# Patient Record
Sex: FEMALE | HISPANIC OR LATINO | Employment: FULL TIME | URBAN - METROPOLITAN AREA
[De-identification: names, ages, dates, MRNs, and addresses within clinical notes are randomized per-mention and may not be internally consistent; named-entity substitution may affect disease eponyms.]

---

## 2018-03-14 ENCOUNTER — NON-PROVIDER VISIT (OUTPATIENT)
Dept: OCCUPATIONAL MEDICINE | Facility: CLINIC | Age: 48
End: 2018-03-14

## 2018-03-14 DIAGNOSIS — Z11.1 ENCOUNTER FOR PPD TEST: ICD-10-CM

## 2018-03-14 PROCEDURE — 86580 TB INTRADERMAL TEST: CPT | Performed by: PREVENTIVE MEDICINE

## 2018-03-16 ENCOUNTER — NON-PROVIDER VISIT (OUTPATIENT)
Dept: OCCUPATIONAL MEDICINE | Facility: CLINIC | Age: 48
End: 2018-03-16

## 2018-03-16 LAB — TB WHEAL 3D P 5 TU DIAM: NORMAL MM

## 2019-05-03 ENCOUNTER — NON-PROVIDER VISIT (OUTPATIENT)
Dept: URGENT CARE | Facility: CLINIC | Age: 49
End: 2019-05-03

## 2019-05-03 DIAGNOSIS — Z11.1 PPD SCREENING TEST: ICD-10-CM

## 2019-05-03 PROCEDURE — 86580 TB INTRADERMAL TEST: CPT | Performed by: PHYSICIAN ASSISTANT

## 2019-05-06 ENCOUNTER — NON-PROVIDER VISIT (OUTPATIENT)
Dept: OCCUPATIONAL MEDICINE | Facility: CLINIC | Age: 49
End: 2019-05-06

## 2019-05-06 DIAGNOSIS — Z11.1 ENCOUNTER FOR PPD SKIN TEST READING: ICD-10-CM

## 2019-05-06 LAB — TB WHEAL 3D P 5 TU DIAM: NORMAL MM

## 2019-05-10 ENCOUNTER — NON-PROVIDER VISIT (OUTPATIENT)
Dept: OCCUPATIONAL MEDICINE | Facility: CLINIC | Age: 49
End: 2019-05-10

## 2019-05-10 DIAGNOSIS — Z11.1 ENCOUNTER FOR PPD TEST: ICD-10-CM

## 2019-05-10 PROCEDURE — 86580 TB INTRADERMAL TEST: CPT | Performed by: PREVENTIVE MEDICINE

## 2019-05-13 ENCOUNTER — NON-PROVIDER VISIT (OUTPATIENT)
Dept: OCCUPATIONAL MEDICINE | Facility: CLINIC | Age: 49
End: 2019-05-13

## 2019-05-13 LAB — TB WHEAL 3D P 5 TU DIAM: NORMAL MM

## 2022-04-12 ENCOUNTER — APPOINTMENT (OUTPATIENT)
Dept: RADIOLOGY | Facility: MEDICAL CENTER | Age: 52
End: 2022-04-12
Attending: EMERGENCY MEDICINE
Payer: MEDICAID

## 2022-04-12 ENCOUNTER — HOSPITAL ENCOUNTER (EMERGENCY)
Facility: MEDICAL CENTER | Age: 52
End: 2022-04-12
Attending: EMERGENCY MEDICINE
Payer: MEDICAID

## 2022-04-12 VITALS
OXYGEN SATURATION: 95 % | HEIGHT: 58 IN | HEART RATE: 99 BPM | DIASTOLIC BLOOD PRESSURE: 75 MMHG | WEIGHT: 250 LBS | RESPIRATION RATE: 17 BRPM | TEMPERATURE: 98.4 F | BODY MASS INDEX: 52.48 KG/M2 | SYSTOLIC BLOOD PRESSURE: 144 MMHG

## 2022-04-12 DIAGNOSIS — E86.0 DEHYDRATION: ICD-10-CM

## 2022-04-12 DIAGNOSIS — R53.83 FATIGUE, UNSPECIFIED TYPE: ICD-10-CM

## 2022-04-12 DIAGNOSIS — E11.65 UNCONTROLLED TYPE 2 DIABETES MELLITUS WITH HYPERGLYCEMIA, WITHOUT LONG-TERM CURRENT USE OF INSULIN (HCC): ICD-10-CM

## 2022-04-12 DIAGNOSIS — B37.49 CANDIDIASIS OF PERINEUM: ICD-10-CM

## 2022-04-12 DIAGNOSIS — R11.2 NAUSEA AND VOMITING, INTRACTABILITY OF VOMITING NOT SPECIFIED, UNSPECIFIED VOMITING TYPE: ICD-10-CM

## 2022-04-12 LAB
ALBUMIN SERPL BCP-MCNC: 3.9 G/DL (ref 3.2–4.9)
ALBUMIN/GLOB SERPL: 1 G/DL
ALP SERPL-CCNC: 105 U/L (ref 30–99)
ALT SERPL-CCNC: 17 U/L (ref 2–50)
ANION GAP SERPL CALC-SCNC: 17 MMOL/L (ref 7–16)
ANISOCYTOSIS BLD QL SMEAR: ABNORMAL
APPEARANCE UR: CLEAR
AST SERPL-CCNC: 12 U/L (ref 12–45)
BACTERIA #/AREA URNS HPF: NEGATIVE /HPF
BASOPHILS # BLD AUTO: 0.4 % (ref 0–1.8)
BASOPHILS # BLD: 0.04 K/UL (ref 0–0.12)
BILIRUB SERPL-MCNC: 0.4 MG/DL (ref 0.1–1.5)
BILIRUB UR QL STRIP.AUTO: NEGATIVE
BUN SERPL-MCNC: 10 MG/DL (ref 8–22)
CALCIUM SERPL-MCNC: 8.6 MG/DL (ref 8.5–10.5)
CHLORIDE SERPL-SCNC: 98 MMOL/L (ref 96–112)
CO2 SERPL-SCNC: 18 MMOL/L (ref 20–33)
COLOR UR: YELLOW
COMMENT 1642: NORMAL
CREAT SERPL-MCNC: 0.42 MG/DL (ref 0.5–1.4)
EKG IMPRESSION: NORMAL
EOSINOPHIL # BLD AUTO: 0.08 K/UL (ref 0–0.51)
EOSINOPHIL NFR BLD: 0.8 % (ref 0–6.9)
EPI CELLS #/AREA URNS HPF: NEGATIVE /HPF
ERYTHROCYTE [DISTWIDTH] IN BLOOD BY AUTOMATED COUNT: 43.4 FL (ref 35.9–50)
GFR SERPLBLD CREATININE-BSD FMLA CKD-EPI: 119 ML/MIN/1.73 M 2
GLOBULIN SER CALC-MCNC: 3.9 G/DL (ref 1.9–3.5)
GLUCOSE BLD STRIP.AUTO-MCNC: 301 MG/DL (ref 65–99)
GLUCOSE SERPL-MCNC: 329 MG/DL (ref 65–99)
GLUCOSE UR STRIP.AUTO-MCNC: >=1000 MG/DL
HCT VFR BLD AUTO: 37.9 % (ref 37–47)
HGB BLD-MCNC: 11 G/DL (ref 12–16)
HYALINE CASTS #/AREA URNS LPF: NORMAL /LPF
IMM GRANULOCYTES # BLD AUTO: 0.07 K/UL (ref 0–0.11)
IMM GRANULOCYTES NFR BLD AUTO: 0.7 % (ref 0–0.9)
KETONES UR STRIP.AUTO-MCNC: >=160 MG/DL
LEUKOCYTE ESTERASE UR QL STRIP.AUTO: NEGATIVE
LG PLATELETS BLD QL SMEAR: NORMAL
LYMPHOCYTES # BLD AUTO: 2.25 K/UL (ref 1–4.8)
LYMPHOCYTES NFR BLD: 21.1 % (ref 22–41)
MCH RBC QN AUTO: 22.2 PG (ref 27–33)
MCHC RBC AUTO-ENTMCNC: 29 G/DL (ref 33.6–35)
MCV RBC AUTO: 76.4 FL (ref 81.4–97.8)
MICRO URNS: ABNORMAL
MICROCYTES BLD QL SMEAR: ABNORMAL
MONOCYTES # BLD AUTO: 0.94 K/UL (ref 0–0.85)
MONOCYTES NFR BLD AUTO: 8.8 % (ref 0–13.4)
MORPHOLOGY BLD-IMP: NORMAL
NEUTROPHILS # BLD AUTO: 7.27 K/UL (ref 2–7.15)
NEUTROPHILS NFR BLD: 68.2 % (ref 44–72)
NITRITE UR QL STRIP.AUTO: NEGATIVE
NRBC # BLD AUTO: 0.02 K/UL
NRBC BLD-RTO: 0.2 /100 WBC
PH UR STRIP.AUTO: 5 [PH] (ref 5–8)
PLATELET # BLD AUTO: 223 K/UL (ref 164–446)
PLATELET BLD QL SMEAR: NORMAL
PMV BLD AUTO: 9.7 FL (ref 9–12.9)
POTASSIUM SERPL-SCNC: 3.8 MMOL/L (ref 3.6–5.5)
PROT SERPL-MCNC: 7.8 G/DL (ref 6–8.2)
PROT UR QL STRIP: 30 MG/DL
RBC # BLD AUTO: 4.96 M/UL (ref 4.2–5.4)
RBC # URNS HPF: NORMAL /HPF
RBC BLD AUTO: PRESENT
RBC UR QL AUTO: NEGATIVE
SODIUM SERPL-SCNC: 133 MMOL/L (ref 135–145)
SP GR UR STRIP.AUTO: 1.04
UROBILINOGEN UR STRIP.AUTO-MCNC: 0.2 MG/DL
WBC # BLD AUTO: 10.7 K/UL (ref 4.8–10.8)
WBC #/AREA URNS HPF: NORMAL /HPF

## 2022-04-12 PROCEDURE — 80053 COMPREHEN METABOLIC PANEL: CPT

## 2022-04-12 PROCEDURE — 71045 X-RAY EXAM CHEST 1 VIEW: CPT

## 2022-04-12 PROCEDURE — 85025 COMPLETE CBC W/AUTO DIFF WBC: CPT

## 2022-04-12 PROCEDURE — 81001 URINALYSIS AUTO W/SCOPE: CPT

## 2022-04-12 PROCEDURE — 700105 HCHG RX REV CODE 258: Performed by: EMERGENCY MEDICINE

## 2022-04-12 PROCEDURE — 700111 HCHG RX REV CODE 636 W/ 250 OVERRIDE (IP): Performed by: EMERGENCY MEDICINE

## 2022-04-12 PROCEDURE — 36415 COLL VENOUS BLD VENIPUNCTURE: CPT

## 2022-04-12 PROCEDURE — 99284 EMERGENCY DEPT VISIT MOD MDM: CPT

## 2022-04-12 PROCEDURE — 96374 THER/PROPH/DIAG INJ IV PUSH: CPT

## 2022-04-12 PROCEDURE — 700102 HCHG RX REV CODE 250 W/ 637 OVERRIDE(OP): Performed by: EMERGENCY MEDICINE

## 2022-04-12 PROCEDURE — A9270 NON-COVERED ITEM OR SERVICE: HCPCS | Performed by: EMERGENCY MEDICINE

## 2022-04-12 PROCEDURE — 93005 ELECTROCARDIOGRAM TRACING: CPT

## 2022-04-12 PROCEDURE — 93005 ELECTROCARDIOGRAM TRACING: CPT | Performed by: EMERGENCY MEDICINE

## 2022-04-12 PROCEDURE — 82962 GLUCOSE BLOOD TEST: CPT

## 2022-04-12 RX ORDER — ONDANSETRON 2 MG/ML
4 INJECTION INTRAMUSCULAR; INTRAVENOUS ONCE
Status: COMPLETED | OUTPATIENT
Start: 2022-04-12 | End: 2022-04-12

## 2022-04-12 RX ORDER — ONDANSETRON 4 MG/1
4 TABLET, ORALLY DISINTEGRATING ORAL EVERY 6 HOURS PRN
Qty: 10 TABLET | Refills: 0 | Status: SHIPPED | OUTPATIENT
Start: 2022-04-12 | End: 2022-05-16

## 2022-04-12 RX ORDER — SODIUM CHLORIDE 9 MG/ML
1000 INJECTION, SOLUTION INTRAVENOUS ONCE
Status: COMPLETED | OUTPATIENT
Start: 2022-04-12 | End: 2022-04-12

## 2022-04-12 RX ORDER — FLUCONAZOLE 150 MG/1
150 TABLET ORAL DAILY
Qty: 1 TABLET | Refills: 1 | Status: SHIPPED | OUTPATIENT
Start: 2022-04-15 | End: 2022-05-16 | Stop reason: SDUPTHER

## 2022-04-12 RX ADMIN — ONDANSETRON 4 MG: 2 INJECTION INTRAMUSCULAR; INTRAVENOUS at 21:01

## 2022-04-12 RX ADMIN — METFORMIN HYDROCHLORIDE 1000 MG: 500 TABLET ORAL at 21:01

## 2022-04-12 RX ADMIN — SODIUM CHLORIDE 1000 ML: 9 INJECTION, SOLUTION INTRAVENOUS at 21:00

## 2022-04-13 ENCOUNTER — APPOINTMENT (OUTPATIENT)
Dept: RADIOLOGY | Facility: MEDICAL CENTER | Age: 52
DRG: 166 | End: 2022-04-13
Attending: EMERGENCY MEDICINE
Payer: MEDICAID

## 2022-04-13 ENCOUNTER — HOSPITAL ENCOUNTER (INPATIENT)
Facility: MEDICAL CENTER | Age: 52
LOS: 3 days | DRG: 166 | End: 2022-04-16
Attending: EMERGENCY MEDICINE | Admitting: INTERNAL MEDICINE
Payer: MEDICAID

## 2022-04-13 DIAGNOSIS — I26.99 PULMONARY EMBOLISM, BILATERAL (HCC): Primary | ICD-10-CM

## 2022-04-13 DIAGNOSIS — E11.10 DIABETIC KETOACIDOSIS WITHOUT COMA ASSOCIATED WITH TYPE 2 DIABETES MELLITUS (HCC): ICD-10-CM

## 2022-04-13 DIAGNOSIS — E87.29 HIGH ANION GAP METABOLIC ACIDOSIS: ICD-10-CM

## 2022-04-13 DIAGNOSIS — E11.65 UNCONTROLLED TYPE 2 DIABETES MELLITUS WITH HYPERGLYCEMIA, WITHOUT LONG-TERM CURRENT USE OF INSULIN (HCC): ICD-10-CM

## 2022-04-13 DIAGNOSIS — I26.02 ACUTE SADDLE PULMONARY EMBOLISM WITH ACUTE COR PULMONALE (HCC): ICD-10-CM

## 2022-04-13 DIAGNOSIS — E10.10 DKA, TYPE 1, NOT AT GOAL (HCC): ICD-10-CM

## 2022-04-13 DIAGNOSIS — I27.20 PULMONARY HTN (HCC): ICD-10-CM

## 2022-04-13 DIAGNOSIS — D64.9 ANEMIA, UNSPECIFIED TYPE: ICD-10-CM

## 2022-04-13 DIAGNOSIS — I26.92 SADDLE EMBOLUS OF PULMONARY ARTERY WITHOUT ACUTE COR PULMONALE, UNSPECIFIED CHRONICITY (HCC): ICD-10-CM

## 2022-04-13 DIAGNOSIS — R00.0 SINUS TACHYCARDIA: ICD-10-CM

## 2022-04-13 PROBLEM — E87.20 METABOLIC ACIDOSIS: Status: ACTIVE | Noted: 2022-04-13

## 2022-04-13 PROBLEM — R73.9 HYPERGLYCEMIA: Status: ACTIVE | Noted: 2022-04-13

## 2022-04-13 LAB
ALBUMIN SERPL BCP-MCNC: 3.6 G/DL (ref 3.2–4.9)
ALBUMIN/GLOB SERPL: 1 G/DL
ALP SERPL-CCNC: 105 U/L (ref 30–99)
ALT SERPL-CCNC: 21 U/L (ref 2–50)
ANION GAP SERPL CALC-SCNC: 20 MMOL/L (ref 7–16)
APPEARANCE UR: CLEAR
AST SERPL-CCNC: 13 U/L (ref 12–45)
B-OH-BUTYR SERPL-MCNC: 2.98 MMOL/L (ref 0.02–0.27)
BACTERIA #/AREA URNS HPF: ABNORMAL /HPF
BASE EXCESS BLDV CALC-SCNC: -11 MMOL/L
BASE EXCESS BLDV CALC-SCNC: -12 MMOL/L
BASOPHILS # BLD AUTO: 0.1 % (ref 0–1.8)
BASOPHILS # BLD: 0.02 K/UL (ref 0–0.12)
BILIRUB SERPL-MCNC: 0.5 MG/DL (ref 0.1–1.5)
BILIRUB UR QL STRIP.AUTO: NEGATIVE
BODY TEMPERATURE: 36.2 CENTIGRADE
BODY TEMPERATURE: 36.7 CENTIGRADE
BUN SERPL-MCNC: 20 MG/DL (ref 8–22)
CALCIUM SERPL-MCNC: 8.2 MG/DL (ref 8.5–10.5)
CHLORIDE SERPL-SCNC: 97 MMOL/L (ref 96–112)
CO2 SERPL-SCNC: 14 MMOL/L (ref 20–33)
COLOR UR: YELLOW
CREAT SERPL-MCNC: 0.91 MG/DL (ref 0.5–1.4)
EOSINOPHIL # BLD AUTO: 0.01 K/UL (ref 0–0.51)
EOSINOPHIL NFR BLD: 0.1 % (ref 0–6.9)
EPI CELLS #/AREA URNS HPF: NEGATIVE /HPF
ERYTHROCYTE [DISTWIDTH] IN BLOOD BY AUTOMATED COUNT: 46.3 FL (ref 35.9–50)
GFR SERPLBLD CREATININE-BSD FMLA CKD-EPI: 77 ML/MIN/1.73 M 2
GLOBULIN SER CALC-MCNC: 3.7 G/DL (ref 1.9–3.5)
GLUCOSE BLD STRIP.AUTO-MCNC: 399 MG/DL (ref 65–99)
GLUCOSE BLD STRIP.AUTO-MCNC: 410 MG/DL (ref 65–99)
GLUCOSE SERPL-MCNC: 541 MG/DL (ref 65–99)
GLUCOSE UR STRIP.AUTO-MCNC: >=1000 MG/DL
HCO3 BLDV-SCNC: 14 MMOL/L (ref 24–28)
HCO3 BLDV-SCNC: 15 MMOL/L (ref 24–28)
HCT VFR BLD AUTO: 37.5 % (ref 37–47)
HGB BLD-MCNC: 10.6 G/DL (ref 12–16)
HYALINE CASTS #/AREA URNS LPF: ABNORMAL /LPF
IMM GRANULOCYTES # BLD AUTO: 0.13 K/UL (ref 0–0.11)
IMM GRANULOCYTES NFR BLD AUTO: 0.9 % (ref 0–0.9)
KETONES UR STRIP.AUTO-MCNC: 15 MG/DL
LACTATE BLD-SCNC: 2.6 MMOL/L (ref 0.5–2)
LACTATE BLD-SCNC: 3.5 MMOL/L (ref 0.5–2)
LACTATE BLD-SCNC: 3.6 MMOL/L (ref 0.5–2)
LEUKOCYTE ESTERASE UR QL STRIP.AUTO: NEGATIVE
LYMPHOCYTES # BLD AUTO: 2.01 K/UL (ref 1–4.8)
LYMPHOCYTES NFR BLD: 13.6 % (ref 22–41)
MAGNESIUM SERPL-MCNC: 1.8 MG/DL (ref 1.5–2.5)
MCH RBC QN AUTO: 22.2 PG (ref 27–33)
MCHC RBC AUTO-ENTMCNC: 28.3 G/DL (ref 33.6–35)
MCV RBC AUTO: 78.6 FL (ref 81.4–97.8)
MICRO URNS: ABNORMAL
MONOCYTES # BLD AUTO: 1.07 K/UL (ref 0–0.85)
MONOCYTES NFR BLD AUTO: 7.2 % (ref 0–13.4)
NEUTROPHILS # BLD AUTO: 11.55 K/UL (ref 2–7.15)
NEUTROPHILS NFR BLD: 78.1 % (ref 44–72)
NITRITE UR QL STRIP.AUTO: NEGATIVE
NRBC # BLD AUTO: 0.08 K/UL
NRBC BLD-RTO: 0.5 /100 WBC
NT-PROBNP SERPL IA-MCNC: 3961 PG/ML (ref 0–125)
PCO2 BLDV: 28.7 MMHG (ref 41–51)
PCO2 BLDV: 41.4 MMHG (ref 41–51)
PCO2 TEMP ADJ BLDV: 28.3 MMHG (ref 41–51)
PCO2 TEMP ADJ BLDV: 39.6 MMHG (ref 41–51)
PH BLDV: 7.18 [PH] (ref 7.31–7.45)
PH BLDV: 7.3 [PH] (ref 7.31–7.45)
PH TEMP ADJ BLDV: 7.2 [PH] (ref 7.31–7.45)
PH TEMP ADJ BLDV: 7.31 [PH] (ref 7.31–7.45)
PH UR STRIP.AUTO: 5.5 [PH] (ref 5–8)
PLATELET # BLD AUTO: 220 K/UL (ref 164–446)
PMV BLD AUTO: 10.5 FL (ref 9–12.9)
PO2 BLDV: 18.9 MMHG (ref 25–40)
PO2 BLDV: 56 MMHG (ref 25–40)
PO2 TEMP ADJ BLDV: 17.6 MMHG (ref 25–40)
PO2 TEMP ADJ BLDV: 54.8 MMHG (ref 25–40)
POTASSIUM SERPL-SCNC: 4.4 MMOL/L (ref 3.6–5.5)
PROT SERPL-MCNC: 7.3 G/DL (ref 6–8.2)
PROT UR QL STRIP: 100 MG/DL
RBC # BLD AUTO: 4.77 M/UL (ref 4.2–5.4)
RBC # URNS HPF: ABNORMAL /HPF
RBC UR QL AUTO: NEGATIVE
SAO2 % BLDV: 16.9 %
SAO2 % BLDV: 84.1 %
SODIUM SERPL-SCNC: 131 MMOL/L (ref 135–145)
SP GR UR STRIP.AUTO: 1.03
TROPONIN T SERPL-MCNC: 17 NG/L (ref 6–19)
UROBILINOGEN UR STRIP.AUTO-MCNC: 1 MG/DL
WBC # BLD AUTO: 14.8 K/UL (ref 4.8–10.8)
WBC #/AREA URNS HPF: ABNORMAL /HPF

## 2022-04-13 PROCEDURE — 700111 HCHG RX REV CODE 636 W/ 250 OVERRIDE (IP): Performed by: STUDENT IN AN ORGANIZED HEALTH CARE EDUCATION/TRAINING PROGRAM

## 2022-04-13 PROCEDURE — 93005 ELECTROCARDIOGRAM TRACING: CPT | Performed by: EMERGENCY MEDICINE

## 2022-04-13 PROCEDURE — 96374 THER/PROPH/DIAG INJ IV PUSH: CPT

## 2022-04-13 PROCEDURE — 85610 PROTHROMBIN TIME: CPT

## 2022-04-13 PROCEDURE — 94760 N-INVAS EAR/PLS OXIMETRY 1: CPT

## 2022-04-13 PROCEDURE — 83880 ASSAY OF NATRIURETIC PEPTIDE: CPT

## 2022-04-13 PROCEDURE — 85730 THROMBOPLASTIN TIME PARTIAL: CPT

## 2022-04-13 PROCEDURE — 85520 HEPARIN ASSAY: CPT

## 2022-04-13 PROCEDURE — 96375 TX/PRO/DX INJ NEW DRUG ADDON: CPT

## 2022-04-13 PROCEDURE — 84484 ASSAY OF TROPONIN QUANT: CPT

## 2022-04-13 PROCEDURE — 71045 X-RAY EXAM CHEST 1 VIEW: CPT

## 2022-04-13 PROCEDURE — 700105 HCHG RX REV CODE 258: Performed by: STUDENT IN AN ORGANIZED HEALTH CARE EDUCATION/TRAINING PROGRAM

## 2022-04-13 PROCEDURE — 71275 CT ANGIOGRAPHY CHEST: CPT

## 2022-04-13 PROCEDURE — 700105 HCHG RX REV CODE 258: Performed by: EMERGENCY MEDICINE

## 2022-04-13 PROCEDURE — 36415 COLL VENOUS BLD VENIPUNCTURE: CPT

## 2022-04-13 PROCEDURE — 700117 HCHG RX CONTRAST REV CODE 255: Performed by: EMERGENCY MEDICINE

## 2022-04-13 PROCEDURE — 83605 ASSAY OF LACTIC ACID: CPT

## 2022-04-13 PROCEDURE — 770022 HCHG ROOM/CARE - ICU (200)

## 2022-04-13 PROCEDURE — 83735 ASSAY OF MAGNESIUM: CPT

## 2022-04-13 PROCEDURE — 99292 CRITICAL CARE ADDL 30 MIN: CPT | Mod: GC | Performed by: STUDENT IN AN ORGANIZED HEALTH CARE EDUCATION/TRAINING PROGRAM

## 2022-04-13 PROCEDURE — 82803 BLOOD GASES ANY COMBINATION: CPT

## 2022-04-13 PROCEDURE — 87040 BLOOD CULTURE FOR BACTERIA: CPT

## 2022-04-13 PROCEDURE — 700102 HCHG RX REV CODE 250 W/ 637 OVERRIDE(OP): Performed by: STUDENT IN AN ORGANIZED HEALTH CARE EDUCATION/TRAINING PROGRAM

## 2022-04-13 PROCEDURE — 80053 COMPREHEN METABOLIC PANEL: CPT

## 2022-04-13 PROCEDURE — 87086 URINE CULTURE/COLONY COUNT: CPT

## 2022-04-13 PROCEDURE — 700111 HCHG RX REV CODE 636 W/ 250 OVERRIDE (IP): Performed by: EMERGENCY MEDICINE

## 2022-04-13 PROCEDURE — 99291 CRITICAL CARE FIRST HOUR: CPT | Mod: GC | Performed by: STUDENT IN AN ORGANIZED HEALTH CARE EDUCATION/TRAINING PROGRAM

## 2022-04-13 PROCEDURE — 81001 URINALYSIS AUTO W/SCOPE: CPT

## 2022-04-13 PROCEDURE — 99291 CRITICAL CARE FIRST HOUR: CPT

## 2022-04-13 PROCEDURE — 82010 KETONE BODYS QUAN: CPT

## 2022-04-13 PROCEDURE — 87077 CULTURE AEROBIC IDENTIFY: CPT

## 2022-04-13 PROCEDURE — 82962 GLUCOSE BLOOD TEST: CPT

## 2022-04-13 PROCEDURE — 700102 HCHG RX REV CODE 250 W/ 637 OVERRIDE(OP): Performed by: EMERGENCY MEDICINE

## 2022-04-13 PROCEDURE — 85025 COMPLETE CBC W/AUTO DIFF WBC: CPT

## 2022-04-13 RX ORDER — MAGNESIUM SULFATE HEPTAHYDRATE 40 MG/ML
4 INJECTION, SOLUTION INTRAVENOUS
Status: COMPLETED | OUTPATIENT
Start: 2022-04-13 | End: 2022-04-14

## 2022-04-13 RX ORDER — DEXTROSE MONOHYDRATE 25 G/50ML
25 INJECTION, SOLUTION INTRAVENOUS
Status: DISCONTINUED | OUTPATIENT
Start: 2022-04-13 | End: 2022-04-14

## 2022-04-13 RX ORDER — PROMETHAZINE HYDROCHLORIDE 25 MG/1
12.5-25 SUPPOSITORY RECTAL EVERY 4 HOURS PRN
Status: DISCONTINUED | OUTPATIENT
Start: 2022-04-13 | End: 2022-04-16 | Stop reason: HOSPADM

## 2022-04-13 RX ORDER — DEXTROSE AND SODIUM CHLORIDE 10; .45 G/100ML; G/100ML
INJECTION, SOLUTION INTRAVENOUS CONTINUOUS
Status: ACTIVE | OUTPATIENT
Start: 2022-04-13 | End: 2022-04-14

## 2022-04-13 RX ORDER — ACETAMINOPHEN 325 MG/1
650 TABLET ORAL EVERY 6 HOURS PRN
Status: DISCONTINUED | OUTPATIENT
Start: 2022-04-13 | End: 2022-04-16 | Stop reason: HOSPADM

## 2022-04-13 RX ORDER — ONDANSETRON 2 MG/ML
4 INJECTION INTRAMUSCULAR; INTRAVENOUS EVERY 4 HOURS PRN
Status: DISCONTINUED | OUTPATIENT
Start: 2022-04-13 | End: 2022-04-16 | Stop reason: HOSPADM

## 2022-04-13 RX ORDER — HEPARIN SODIUM 1000 [USP'U]/ML
40 INJECTION, SOLUTION INTRAVENOUS; SUBCUTANEOUS PRN
Status: DISCONTINUED | OUTPATIENT
Start: 2022-04-13 | End: 2022-04-14

## 2022-04-13 RX ORDER — PROCHLORPERAZINE EDISYLATE 5 MG/ML
5-10 INJECTION INTRAMUSCULAR; INTRAVENOUS EVERY 4 HOURS PRN
Status: DISCONTINUED | OUTPATIENT
Start: 2022-04-13 | End: 2022-04-16 | Stop reason: HOSPADM

## 2022-04-13 RX ORDER — LABETALOL HYDROCHLORIDE 5 MG/ML
10 INJECTION, SOLUTION INTRAVENOUS EVERY 4 HOURS PRN
Status: DISCONTINUED | OUTPATIENT
Start: 2022-04-13 | End: 2022-04-13

## 2022-04-13 RX ORDER — SODIUM CHLORIDE 9 MG/ML
2000 INJECTION, SOLUTION INTRAVENOUS ONCE
Status: DISCONTINUED | OUTPATIENT
Start: 2022-04-13 | End: 2022-04-14

## 2022-04-13 RX ORDER — FUROSEMIDE 10 MG/ML
20 INJECTION INTRAMUSCULAR; INTRAVENOUS ONCE
Status: COMPLETED | OUTPATIENT
Start: 2022-04-13 | End: 2022-04-13

## 2022-04-13 RX ORDER — HEPARIN SODIUM 5000 [USP'U]/ML
5000 INJECTION, SOLUTION INTRAVENOUS; SUBCUTANEOUS EVERY 8 HOURS
Status: DISCONTINUED | OUTPATIENT
Start: 2022-04-13 | End: 2022-04-13

## 2022-04-13 RX ORDER — POLYETHYLENE GLYCOL 3350 17 G/17G
1 POWDER, FOR SOLUTION ORAL
Status: DISCONTINUED | OUTPATIENT
Start: 2022-04-13 | End: 2022-04-16 | Stop reason: HOSPADM

## 2022-04-13 RX ORDER — SODIUM CHLORIDE 9 MG/ML
1000 INJECTION, SOLUTION INTRAVENOUS ONCE
Status: COMPLETED | OUTPATIENT
Start: 2022-04-13 | End: 2022-04-13

## 2022-04-13 RX ORDER — SODIUM CHLORIDE, SODIUM LACTATE, POTASSIUM CHLORIDE, CALCIUM CHLORIDE 600; 310; 30; 20 MG/100ML; MG/100ML; MG/100ML; MG/100ML
INJECTION, SOLUTION INTRAVENOUS CONTINUOUS
Status: DISCONTINUED | OUTPATIENT
Start: 2022-04-13 | End: 2022-04-14

## 2022-04-13 RX ORDER — MAGNESIUM SULFATE HEPTAHYDRATE 40 MG/ML
2 INJECTION, SOLUTION INTRAVENOUS
Status: COMPLETED | OUTPATIENT
Start: 2022-04-13 | End: 2022-04-14

## 2022-04-13 RX ORDER — DEXTROSE, SODIUM CHLORIDE, SODIUM LACTATE, POTASSIUM CHLORIDE, AND CALCIUM CHLORIDE 5; .6; .31; .03; .02 G/100ML; G/100ML; G/100ML; G/100ML; G/100ML
INJECTION, SOLUTION INTRAVENOUS CONTINUOUS
Status: DISCONTINUED | OUTPATIENT
Start: 2022-04-13 | End: 2022-04-14

## 2022-04-13 RX ORDER — BISACODYL 10 MG
10 SUPPOSITORY, RECTAL RECTAL
Status: DISCONTINUED | OUTPATIENT
Start: 2022-04-13 | End: 2022-04-16 | Stop reason: HOSPADM

## 2022-04-13 RX ORDER — ONDANSETRON 4 MG/1
4 TABLET, ORALLY DISINTEGRATING ORAL EVERY 4 HOURS PRN
Status: DISCONTINUED | OUTPATIENT
Start: 2022-04-13 | End: 2022-04-16 | Stop reason: HOSPADM

## 2022-04-13 RX ORDER — FLUCONAZOLE 150 MG/1
150 TABLET ORAL DAILY
Status: DISCONTINUED | OUTPATIENT
Start: 2022-04-14 | End: 2022-04-13

## 2022-04-13 RX ORDER — GUAIFENESIN/DEXTROMETHORPHAN 100-10MG/5
10 SYRUP ORAL EVERY 6 HOURS PRN
Status: DISCONTINUED | OUTPATIENT
Start: 2022-04-13 | End: 2022-04-16 | Stop reason: HOSPADM

## 2022-04-13 RX ORDER — AMOXICILLIN 250 MG
2 CAPSULE ORAL 2 TIMES DAILY
Status: DISCONTINUED | OUTPATIENT
Start: 2022-04-13 | End: 2022-04-16 | Stop reason: HOSPADM

## 2022-04-13 RX ORDER — PROMETHAZINE HYDROCHLORIDE 25 MG/1
12.5-25 TABLET ORAL EVERY 4 HOURS PRN
Status: DISCONTINUED | OUTPATIENT
Start: 2022-04-13 | End: 2022-04-16 | Stop reason: HOSPADM

## 2022-04-13 RX ORDER — HEPARIN SODIUM 5000 [USP'U]/100ML
0-30 INJECTION, SOLUTION INTRAVENOUS CONTINUOUS
Status: DISCONTINUED | OUTPATIENT
Start: 2022-04-13 | End: 2022-04-14

## 2022-04-13 RX ORDER — HEPARIN SODIUM 1000 [USP'U]/ML
80 INJECTION, SOLUTION INTRAVENOUS; SUBCUTANEOUS ONCE
Status: COMPLETED | OUTPATIENT
Start: 2022-04-13 | End: 2022-04-13

## 2022-04-13 RX ORDER — SODIUM CHLORIDE, SODIUM LACTATE, POTASSIUM CHLORIDE, AND CALCIUM CHLORIDE .6; .31; .03; .02 G/100ML; G/100ML; G/100ML; G/100ML
500 INJECTION, SOLUTION INTRAVENOUS ONCE
Status: DISCONTINUED | OUTPATIENT
Start: 2022-04-13 | End: 2022-04-13

## 2022-04-13 RX ORDER — ONDANSETRON 2 MG/ML
4 INJECTION INTRAMUSCULAR; INTRAVENOUS ONCE
Status: COMPLETED | OUTPATIENT
Start: 2022-04-13 | End: 2022-04-13

## 2022-04-13 RX ORDER — SODIUM CHLORIDE, SODIUM LACTATE, POTASSIUM CHLORIDE, CALCIUM CHLORIDE 600; 310; 30; 20 MG/100ML; MG/100ML; MG/100ML; MG/100ML
INJECTION, SOLUTION INTRAVENOUS CONTINUOUS
Status: DISCONTINUED | OUTPATIENT
Start: 2022-04-13 | End: 2022-04-13

## 2022-04-13 RX ORDER — SODIUM CHLORIDE, SODIUM LACTATE, POTASSIUM CHLORIDE, AND CALCIUM CHLORIDE .6; .31; .03; .02 G/100ML; G/100ML; G/100ML; G/100ML
1000 INJECTION, SOLUTION INTRAVENOUS ONCE
Status: COMPLETED | OUTPATIENT
Start: 2022-04-13 | End: 2022-04-14

## 2022-04-13 RX ADMIN — SODIUM BICARBONATE 50 MEQ: 84 INJECTION, SOLUTION INTRAVENOUS at 23:51

## 2022-04-13 RX ADMIN — SODIUM CHLORIDE 6 UNITS/HR: 9 INJECTION, SOLUTION INTRAVENOUS at 23:34

## 2022-04-13 RX ADMIN — SODIUM CHLORIDE, POTASSIUM CHLORIDE, SODIUM LACTATE AND CALCIUM CHLORIDE 1000 ML: 600; 310; 30; 20 INJECTION, SOLUTION INTRAVENOUS at 23:55

## 2022-04-13 RX ADMIN — INSULIN HUMAN 10 UNITS: 100 INJECTION, SOLUTION PARENTERAL at 20:15

## 2022-04-13 RX ADMIN — HEPARIN SODIUM 18 UNITS/KG/HR: 5000 INJECTION, SOLUTION INTRAVENOUS at 23:41

## 2022-04-13 RX ADMIN — FUROSEMIDE 20 MG: 10 INJECTION, SOLUTION INTRAMUSCULAR; INTRAVENOUS at 21:31

## 2022-04-13 RX ADMIN — SODIUM CHLORIDE 1000 ML: 9 INJECTION, SOLUTION INTRAVENOUS at 21:32

## 2022-04-13 RX ADMIN — SODIUM CHLORIDE, POTASSIUM CHLORIDE, SODIUM LACTATE AND CALCIUM CHLORIDE: 600; 310; 30; 20 INJECTION, SOLUTION INTRAVENOUS at 23:35

## 2022-04-13 RX ADMIN — SODIUM CHLORIDE 1000 ML: 9 INJECTION, SOLUTION INTRAVENOUS at 19:01

## 2022-04-13 RX ADMIN — IOHEXOL 45 ML: 350 INJECTION, SOLUTION INTRAVENOUS at 22:25

## 2022-04-13 RX ADMIN — HEPARIN SODIUM 5800 UNITS: 1000 INJECTION, SOLUTION INTRAVENOUS; SUBCUTANEOUS at 23:41

## 2022-04-13 RX ADMIN — ONDANSETRON 4 MG: 2 INJECTION INTRAMUSCULAR; INTRAVENOUS at 19:01

## 2022-04-13 ASSESSMENT — FIBROSIS 4 INDEX
FIB4 SCORE: 0.65
FIB4 SCORE: 0.64

## 2022-04-13 NOTE — ED TRIAGE NOTES
.  Chief Complaint   Patient presents with   • Shortness of Breath     X 1 day Worse when walking   • Cough     X 1 day   • Weakness   • Yeast Infection     Per pt      Pt to triage with family by w/c. Reports above c/c x 1 day. Chronic yeast infection. EKG complete on arrival.

## 2022-04-13 NOTE — ED NOTES
Assessment made. Chart up for MD to see. C/o SOB  With exertion started last night. Also states that she might have a yeast infection.

## 2022-04-13 NOTE — ED PROVIDER NOTES
ED Provider Note    ED Provider Note    Scribed for Michel Morales MD by Michel Morales M.D.. 4/12/2022, 8:24 PM.    Primary care provider: Gabrielle Layton P.A.-C.  Means of arrival: Private  History obtained from: Patient and daughter  History limited by: None    CHIEF COMPLAINT  Chief Complaint   Patient presents with   • Shortness of Breath     X 1 day Worse when walking   • Cough     X 1 day   • Weakness   • Yeast Infection     Per pt        HPI  Alis Faye is a 51 y.o. female who presents to the Emergency Department for evaluation of generalized fatigue.  Patient notes she feels dyspneic when she is exerting herself including actives of daily living, this is been going on for least 4 months.  She lost her medical insurance and has not been taking her Metformin for that time.  She notes polyuria polydipsia.  She is tachycardic and does appear to be dehydrated is indeed hyperglycemic.  She notes the same time she has had a pruritic but not particularly painful large amount of white exudate from her vagina, she notes this is similar to previous yeast infections, she is used no over-the-counter medications for it.  No fever, nausea and dry mouth and polydipsia but no vomiting.  No vaginal bleeding.  No cough, no chest pain.    REVIEW OF SYSTEMS  Pertinent positives include dry mouth, fatigue, polydipsia, medication noncompliance. Pertinent negatives include no fever, no vaginal bleeding, no dysuria, no cough.  All other systems reviewed and negative.    PAST MEDICAL HISTORY   has a past medical history of Anemia, Diabetes, and Obesity.    SURGICAL HISTORY  patient denies any surgical history    SOCIAL HISTORY  Social History     Tobacco Use   • Smoking status: Never Smoker   Substance Use Topics   • Alcohol use: No   • Drug use: No      Social History     Substance and Sexual Activity   Drug Use No       FAMILY HISTORY  Noncontributory    CURRENT MEDICATIONS  Home Medications     Reviewed  "by Sherrie Hunter R.N. (Registered Nurse) on 04/12/22 at 1813  Med List Status: Complete   Medication Last Dose Status   hydrocodone-acetaminophen (NORCO) 5-325 MG TABS per tablet  Active   ibuprofen (MOTRIN) 800 MG TABS  Active   metformin (GLUCOPHAGE) 500 MG TABS Not Taking Active                ALLERGIES  Allergies   Allergen Reactions   • Nkda [No Known Drug Allergy]        PHYSICAL EXAM  VITAL SIGNS: /75   Pulse 99   Temp 36.9 °C (98.4 °F) (Temporal)   Resp 17   Ht 1.473 m (4' 10\")   Wt 113 kg (250 lb)   LMP 03/28/2022   SpO2 95%   BMI 52.25 kg/m²     General: Alert, no acute distress  Skin: Warm, dry, normal for ethnicity  Head: Normocephalic, atraumatic  Neck: Trachea midline, no tenderness  Eye: PERRL, normal conjunctiva, sclera are anicteric.  ENMT: Oral mucosa pink and dry.  Cardiovascular: S1, S2, mildly tachycardic, otherwise regular rate and rhythm, No murmur, Normal peripheral perfusion  Respiratory: Lungs CTA, respirations are non-labored, breath sounds are equal; no Rales, no rhonchi, no accessory muscle use nor retractions appreciated.  Gastrointestinal: Soft,  non distended.   exam demonstrates moderate white.  Exudate both within the vaginal vault and along the perineum consistent with candidiasis  Musculoskeletal: No swelling, no deformity  Neurological: Alert and oriented to person, place, time, and situation  Lymphatics: No lymphadenopathy  Psychiatric: Cooperative, mildly anxious, otherwise appropriate mood & affect      DIAGNOSTIC STUDIES/PROCEDURES    LABS  Results for orders placed or performed during the hospital encounter of 04/12/22   CBC with Differential   Result Value Ref Range    WBC 10.7 4.8 - 10.8 K/uL    RBC 4.96 4.20 - 5.40 M/uL    Hemoglobin 11.0 (L) 12.0 - 16.0 g/dL    Hematocrit 37.9 37.0 - 47.0 %    MCV 76.4 (L) 81.4 - 97.8 fL    MCH 22.2 (L) 27.0 - 33.0 pg    MCHC 29.0 (L) 33.6 - 35.0 g/dL    RDW 43.4 35.9 - 50.0 fL    Platelet Count 223 164 - 446 K/uL "    MPV 9.7 9.0 - 12.9 fL    Neutrophils-Polys 68.20 44.00 - 72.00 %    Lymphocytes 21.10 (L) 22.00 - 41.00 %    Monocytes 8.80 0.00 - 13.40 %    Eosinophils 0.80 0.00 - 6.90 %    Basophils 0.40 0.00 - 1.80 %    Immature Granulocytes 0.70 0.00 - 0.90 %    Nucleated RBC 0.20 /100 WBC    Neutrophils (Absolute) 7.27 (H) 2.00 - 7.15 K/uL    Lymphs (Absolute) 2.25 1.00 - 4.80 K/uL    Monos (Absolute) 0.94 (H) 0.00 - 0.85 K/uL    Eos (Absolute) 0.08 0.00 - 0.51 K/uL    Baso (Absolute) 0.04 0.00 - 0.12 K/uL    Immature Granulocytes (abs) 0.07 0.00 - 0.11 K/uL    NRBC (Absolute) 0.02 K/uL    Anisocytosis 1+     Microcytosis 1+    Comp Metabolic Panel   Result Value Ref Range    Sodium 133 (L) 135 - 145 mmol/L    Potassium 3.8 3.6 - 5.5 mmol/L    Chloride 98 96 - 112 mmol/L    Co2 18 (L) 20 - 33 mmol/L    Anion Gap 17.0 (H) 7.0 - 16.0    Glucose 329 (H) 65 - 99 mg/dL    Bun 10 8 - 22 mg/dL    Creatinine 0.42 (L) 0.50 - 1.40 mg/dL    Calcium 8.6 8.5 - 10.5 mg/dL    AST(SGOT) 12 12 - 45 U/L    ALT(SGPT) 17 2 - 50 U/L    Alkaline Phosphatase 105 (H) 30 - 99 U/L    Total Bilirubin 0.4 0.1 - 1.5 mg/dL    Albumin 3.9 3.2 - 4.9 g/dL    Total Protein 7.8 6.0 - 8.2 g/dL    Globulin 3.9 (H) 1.9 - 3.5 g/dL    A-G Ratio 1.0 g/dL   ESTIMATED GFR   Result Value Ref Range    GFR (CKD-EPI) 119 >60 mL/min/1.73 m 2   URINALYSIS    Specimen: Urine, Clean Catch   Result Value Ref Range    Color Yellow     Character Clear     Specific Gravity 1.044 <1.035    Ph 5.0 5.0 - 8.0    Glucose >=1000 (A) Negative mg/dL    Ketones >=160 Negative mg/dL    Protein 30 (A) Negative mg/dL    Bilirubin Negative Negative    Urobilinogen, Urine 0.2 Negative    Nitrite Negative Negative    Leukocyte Esterase Negative Negative    Occult Blood Negative Negative    Micro Urine Req Microscopic    URINE MICROSCOPIC (W/UA)   Result Value Ref Range    WBC 2-5 /hpf    RBC 0-2 /hpf    Bacteria Negative None /hpf    Epithelial Cells Negative /hpf    Hyaline Cast 0-2 /lpf    PERIPHERAL SMEAR REVIEW   Result Value Ref Range    Peripheral Smear Review see below    PLATELET ESTIMATE   Result Value Ref Range    Plt Estimation Normal    MORPHOLOGY   Result Value Ref Range    RBC Morphology Present     Large Platelets 1+    DIFFERENTIAL COMMENT   Result Value Ref Range    Comments-Diff see below    EKG (NOW)   Result Value Ref Range    Report       [facility] Emergency Dept.    Test Date:  2022  Pt Name:    TERESO TERESA              Department: ER  MRN:        [id number]                      Room:  Gender:     Female                       Technician: [id number]  :        1970                   Requested By:ER TRIAGE PROTOCOL  Order #:    [id number]                    Reading MD: EMMA PEREZ MD    Measurements  Intervals                                Axis  Rate:       119                          P:          49  OH:         133                          QRS:        122  QRSD:       92                           T:          -27  QT:         343  QTc:        483    Interpretive Statements  Sinus tachycardia  Inferior infarct, old  Probable anterior infarct, age indeterminate  Compared to ECG 2010 13:38:51  Myocardial infarct finding now present  Sinus rhythm no longer present  T-wave abnormality no longer present  Electronically Signed On 2022 22:30:10 PDT by EMMA BYRD MD     POCT glucose device results   Result Value Ref Range    POC Glucose, Blood 301 (H) 65 - 99 mg/dL     All labs reviewed by me.    EKG  12 Lead EKG obtained at  and interpreted by me to show:  Rhythm: Sinus tachycardia  Rate: 119  Axis: Normal  Intervals: Normal  Q Waves: Normal  No diagnostic ST segment elevation    Clinical Impression: Normal EKG  Compared to no significant change from 2010    RADIOLOGY  DX-CHEST-PORTABLE (1 VIEW)   Final Result      No evidence of acute cardiopulmonary process.        The radiologist's interpretation of  "all radiological studies have been reviewed by me.    COURSE & MEDICAL DECISION MAKING  Pertinent Labs & Imaging studies reviewed. (See chart for details)    8:24 PM - Patient seen and examined at bedside. Patient will be treated with Zofran 4 mg, 1 L of crystalloid, Metformin. Ordered metabolic work-up to evaluate her symptoms. The differential diagnoses include but are not limited to: Hyperosmolar state, dehydration, hyperglycemia, DKA, sepsis, UTI, yeast infection    2038: Patient reassessed with female chaperone nurse practitioner Regina, indeed evidence of significant yeast infection.  I have already ordered IV fluids, awaiting urinalysis.    2225: Patient reassessed, well in appearance.  Blood sugar improving, currently 301.  Tachycardia resolved, heart rate 99.    HYDRATION: Based on the patient's presentation of Dehydration, Hyperglycemia and Tachycardia the patient was given IV fluids. IV Hydration was used because oral hydration was not adequate alone. Upon recheck following hydration, the patient was Doing better, tachycardia resolved, heart rate 90.    Patient Vitals for the past 24 hrs:   BP Temp Temp src Pulse Resp SpO2 Height Weight   04/12/22 2224 144/75 -- -- 99 17 95 % -- --   04/12/22 2155 151/86 36.9 °C (98.4 °F) Temporal (!) 108 15 93 % -- --   04/12/22 1755 (!) 189/107 36.8 °C (98.3 °F) Temporal (!) 115 18 93 % 1.473 m (4' 10\") 113 kg (250 lb)     HTN/IDDM FOLLOW UP:  The patient is referred to a primary physician for blood pressure management, diabetic screening, and for all other preventive health concerns    Decision Making:  This is a 51 y.o. year old female who presents with concerns of generalized fatigue and malaise with polydipsia and polyuria.  She also relates yeast infection now for over a month.  She is a diabetic and mid she is been off of her medications for several months due to insurance problems.  Patient is obviously volume depleted and is tachycardic with dry oral mucous " membranes.  She has a mildly elevated anion gap and mildly depressed bicarb all consistent with significant volume depletion.  Thankfully no evidence consistent with diabetic ketoacidosis on her metabolic work-up.  Blood sugars are improved with IV fluids.  Given her Metformin here will write her for the same.  She is nauseous but is able to tolerate p.o. after Zofran.  Urine is not infected, though she is tachycardic she is afebrile and has no leukocytosis as such this is not consistent with septicemia.  EKG is nonischemic and essentially unchanged from previous other than rate.  Significant candidiasis noted on  exam, clear indication for Diflucan here and I written for the same for home to do in 72 hours.  No indication for inpatient management at this time.    The patient will return for new or worsening symptoms and is stable at the time of discharge.    Patient has had high blood pressure while in the emergency department, felt likely secondary to medical condition. Counseled patient to monitor blood pressure at home and follow up with primary care physician.      DISPOSITION:  Patient will be discharged home in stable condition.    FOLLOW UP:  Charlotte Bailey M.D.  [address]  [facility] [address]  [phone]    Schedule an appointment as soon as possible for a visit in 3 days        OUTPATIENT MEDICATIONS:  Discharge Medication List as of 4/12/2022 10:23 PM      START taking these medications    Details   ondansetron (ZOFRAN ODT) 4 MG TABLET DISPERSIBLE Take 1 Tablet by mouth every 6 hours as needed for Nausea., Disp-10 Tablet, R-0, Normal      fluconazole (DIFLUCAN) 150 MG tablet Take 1 Tablet by mouth every day.Take on Friday, may repeat in 72 hours if symptoms persist.Disp-1 Tablet, R-1, Normal               FINAL IMPRESSION  1. Uncontrolled type 2 diabetes mellitus with hyperglycemia, without long-term current use of insulin (HCC)    2. Dehydration    3. Candidiasis of perineum    4. Fatigue,  unspecified type    5. Nausea and vomiting, intractability of vomiting not specified, unspecified vomiting type          I, Michel Morales M.D. (Scribe), am scribing for, and in the presence of, Michel Morales MD.    Electronically signed by: Michel Morales M.D. (Scribe), 4/12/2022    I, Michel Morales MD personally performed the services described in this documentation, as scribed by Michel Morales M.D. in my presence, and it is both accurate and complete    The note accurately reflects work and decisions made by me.  Michel Morales M.D.  4/13/2022  1:08 AM

## 2022-04-14 ENCOUNTER — APPOINTMENT (OUTPATIENT)
Dept: RADIOLOGY | Facility: MEDICAL CENTER | Age: 52
DRG: 166 | End: 2022-04-14
Attending: STUDENT IN AN ORGANIZED HEALTH CARE EDUCATION/TRAINING PROGRAM
Payer: MEDICAID

## 2022-04-14 ENCOUNTER — APPOINTMENT (OUTPATIENT)
Dept: RADIOLOGY | Facility: MEDICAL CENTER | Age: 52
DRG: 166 | End: 2022-04-14
Attending: RADIOLOGY
Payer: MEDICAID

## 2022-04-14 ENCOUNTER — APPOINTMENT (OUTPATIENT)
Dept: CARDIOLOGY | Facility: MEDICAL CENTER | Age: 52
DRG: 166 | End: 2022-04-14
Attending: INTERNAL MEDICINE
Payer: MEDICAID

## 2022-04-14 PROBLEM — J96.91 RESPIRATORY FAILURE WITH HYPOXIA (HCC): Status: ACTIVE | Noted: 2022-04-14

## 2022-04-14 LAB
ALBUMIN SERPL BCP-MCNC: 3.4 G/DL (ref 3.2–4.9)
ALBUMIN/GLOB SERPL: 0.9 G/DL
ALP SERPL-CCNC: 101 U/L (ref 30–99)
ALT SERPL-CCNC: 21 U/L (ref 2–50)
ANION GAP SERPL CALC-SCNC: 12 MMOL/L (ref 7–16)
ANION GAP SERPL CALC-SCNC: 14 MMOL/L (ref 7–16)
ANION GAP SERPL CALC-SCNC: 16 MMOL/L (ref 7–16)
ANION GAP SERPL CALC-SCNC: 16 MMOL/L (ref 7–16)
APTT PPP: 29 SEC (ref 24.7–36)
APTT PPP: 37.6 SEC (ref 24.7–36)
AST SERPL-CCNC: 21 U/L (ref 12–45)
BASOPHILS # BLD AUTO: 0.2 % (ref 0–1.8)
BASOPHILS # BLD: 0.03 K/UL (ref 0–0.12)
BILIRUB SERPL-MCNC: 0.5 MG/DL (ref 0.1–1.5)
BUN SERPL-MCNC: 22 MG/DL (ref 8–22)
BUN SERPL-MCNC: 26 MG/DL (ref 8–22)
BUN SERPL-MCNC: 28 MG/DL (ref 8–22)
BUN SERPL-MCNC: 29 MG/DL (ref 8–22)
CALCIUM SERPL-MCNC: 8 MG/DL (ref 8.5–10.5)
CALCIUM SERPL-MCNC: 8.1 MG/DL (ref 8.5–10.5)
CALCIUM SERPL-MCNC: 8.4 MG/DL (ref 8.5–10.5)
CALCIUM SERPL-MCNC: 8.5 MG/DL (ref 8.5–10.5)
CHLORIDE SERPL-SCNC: 103 MMOL/L (ref 96–112)
CHLORIDE SERPL-SCNC: 107 MMOL/L (ref 96–112)
CHLORIDE SERPL-SCNC: 109 MMOL/L (ref 96–112)
CHLORIDE SERPL-SCNC: 99 MMOL/L (ref 96–112)
CO2 SERPL-SCNC: 15 MMOL/L (ref 20–33)
CO2 SERPL-SCNC: 15 MMOL/L (ref 20–33)
CO2 SERPL-SCNC: 16 MMOL/L (ref 20–33)
CO2 SERPL-SCNC: 16 MMOL/L (ref 20–33)
CREAT SERPL-MCNC: 0.72 MG/DL (ref 0.5–1.4)
CREAT SERPL-MCNC: 1.01 MG/DL (ref 0.5–1.4)
CREAT SERPL-MCNC: 1.06 MG/DL (ref 0.5–1.4)
CREAT SERPL-MCNC: 1.27 MG/DL (ref 0.5–1.4)
EOSINOPHIL # BLD AUTO: 0.01 K/UL (ref 0–0.51)
EOSINOPHIL NFR BLD: 0.1 % (ref 0–6.9)
ERYTHROCYTE [DISTWIDTH] IN BLOOD BY AUTOMATED COUNT: 43.8 FL (ref 35.9–50)
ERYTHROCYTE [DISTWIDTH] IN BLOOD BY AUTOMATED COUNT: 45.1 FL (ref 35.9–50)
ERYTHROCYTE [DISTWIDTH] IN BLOOD BY AUTOMATED COUNT: 45.7 FL (ref 35.9–50)
GFR SERPLBLD CREATININE-BSD FMLA CKD-EPI: 101 ML/MIN/1.73 M 2
GFR SERPLBLD CREATININE-BSD FMLA CKD-EPI: 51 ML/MIN/1.73 M 2
GFR SERPLBLD CREATININE-BSD FMLA CKD-EPI: 64 ML/MIN/1.73 M 2
GFR SERPLBLD CREATININE-BSD FMLA CKD-EPI: 68 ML/MIN/1.73 M 2
GLOBULIN SER CALC-MCNC: 3.9 G/DL (ref 1.9–3.5)
GLUCOSE BLD STRIP.AUTO-MCNC: 104 MG/DL (ref 65–99)
GLUCOSE BLD STRIP.AUTO-MCNC: 114 MG/DL (ref 65–99)
GLUCOSE BLD STRIP.AUTO-MCNC: 131 MG/DL (ref 65–99)
GLUCOSE BLD STRIP.AUTO-MCNC: 308 MG/DL (ref 65–99)
GLUCOSE BLD STRIP.AUTO-MCNC: 330 MG/DL (ref 65–99)
GLUCOSE BLD STRIP.AUTO-MCNC: 352 MG/DL (ref 65–99)
GLUCOSE BLD STRIP.AUTO-MCNC: 357 MG/DL (ref 65–99)
GLUCOSE BLD STRIP.AUTO-MCNC: 360 MG/DL (ref 65–99)
GLUCOSE BLD STRIP.AUTO-MCNC: 365 MG/DL (ref 65–99)
GLUCOSE BLD STRIP.AUTO-MCNC: 366 MG/DL (ref 65–99)
GLUCOSE BLD STRIP.AUTO-MCNC: 421 MG/DL (ref 65–99)
GLUCOSE BLD STRIP.AUTO-MCNC: 427 MG/DL (ref 65–99)
GLUCOSE BLD STRIP.AUTO-MCNC: 80 MG/DL (ref 65–99)
GLUCOSE BLD STRIP.AUTO-MCNC: 81 MG/DL (ref 65–99)
GLUCOSE BLD STRIP.AUTO-MCNC: 85 MG/DL (ref 65–99)
GLUCOSE BLD STRIP.AUTO-MCNC: 98 MG/DL (ref 65–99)
GLUCOSE SERPL-MCNC: 106 MG/DL (ref 65–99)
GLUCOSE SERPL-MCNC: 112 MG/DL (ref 65–99)
GLUCOSE SERPL-MCNC: 401 MG/DL (ref 65–99)
GLUCOSE SERPL-MCNC: 461 MG/DL (ref 65–99)
HCT VFR BLD AUTO: 32.5 % (ref 37–47)
HCT VFR BLD AUTO: 33.6 % (ref 37–47)
HCT VFR BLD AUTO: 34 % (ref 37–47)
HGB BLD-MCNC: 10.2 G/DL (ref 12–16)
HGB BLD-MCNC: 9.6 G/DL (ref 12–16)
HGB BLD-MCNC: 9.7 G/DL (ref 12–16)
IMM GRANULOCYTES # BLD AUTO: 0.09 K/UL (ref 0–0.11)
IMM GRANULOCYTES NFR BLD AUTO: 0.7 % (ref 0–0.9)
INR PPP: 1.31 (ref 0.87–1.13)
INR PPP: 1.38 (ref 0.87–1.13)
INR PPP: 1.44 (ref 0.87–1.13)
LV EJECT FRACT  99904: 45
LV EJECT FRACT MOD 2C 99903: 37.25
LV EJECT FRACT MOD 4C 99902: 34.67
LV EJECT FRACT MOD BP 99901: 35.77
LYMPHOCYTES # BLD AUTO: 2.01 K/UL (ref 1–4.8)
LYMPHOCYTES NFR BLD: 14.6 % (ref 22–41)
MAGNESIUM SERPL-MCNC: 1.7 MG/DL (ref 1.5–2.5)
MAGNESIUM SERPL-MCNC: 2 MG/DL (ref 1.5–2.5)
MAGNESIUM SERPL-MCNC: 2.1 MG/DL (ref 1.5–2.5)
MAGNESIUM SERPL-MCNC: 2.1 MG/DL (ref 1.5–2.5)
MCH RBC QN AUTO: 22.3 PG (ref 27–33)
MCH RBC QN AUTO: 22.4 PG (ref 27–33)
MCH RBC QN AUTO: 22.5 PG (ref 27–33)
MCHC RBC AUTO-ENTMCNC: 28.9 G/DL (ref 33.6–35)
MCHC RBC AUTO-ENTMCNC: 29.5 G/DL (ref 33.6–35)
MCHC RBC AUTO-ENTMCNC: 30 G/DL (ref 33.6–35)
MCV RBC AUTO: 75.1 FL (ref 81.4–97.8)
MCV RBC AUTO: 75.9 FL (ref 81.4–97.8)
MCV RBC AUTO: 77.2 FL (ref 81.4–97.8)
MONOCYTES # BLD AUTO: 1.27 K/UL (ref 0–0.85)
MONOCYTES NFR BLD AUTO: 9.2 % (ref 0–13.4)
MORPHOLOGY BLD-IMP: NORMAL
NEUTROPHILS # BLD AUTO: 10.33 K/UL (ref 2–7.15)
NEUTROPHILS NFR BLD: 75.2 % (ref 44–72)
NRBC # BLD AUTO: 0.14 K/UL
NRBC BLD-RTO: 1 /100 WBC
PHOSPHATE SERPL-MCNC: 3.3 MG/DL (ref 2.5–4.5)
PHOSPHATE SERPL-MCNC: 3.5 MG/DL (ref 2.5–4.5)
PHOSPHATE SERPL-MCNC: 3.6 MG/DL (ref 2.5–4.5)
PHOSPHATE SERPL-MCNC: 3.7 MG/DL (ref 2.5–4.5)
PLATELET # BLD AUTO: 166 K/UL (ref 164–446)
PLATELET # BLD AUTO: 180 K/UL (ref 164–446)
PLATELET # BLD AUTO: 213 K/UL (ref 164–446)
PMV BLD AUTO: 10.5 FL (ref 9–12.9)
PMV BLD AUTO: 10.5 FL (ref 9–12.9)
PMV BLD AUTO: 9.7 FL (ref 9–12.9)
POTASSIUM SERPL-SCNC: 3.5 MMOL/L (ref 3.6–5.5)
POTASSIUM SERPL-SCNC: 4.2 MMOL/L (ref 3.6–5.5)
POTASSIUM SERPL-SCNC: 4.9 MMOL/L (ref 3.6–5.5)
POTASSIUM SERPL-SCNC: 5.1 MMOL/L (ref 3.6–5.5)
PROCALCITONIN SERPL-MCNC: 0.06 NG/ML
PROT SERPL-MCNC: 7.3 G/DL (ref 6–8.2)
PROTHROMBIN TIME: 15.9 SEC (ref 12–14.6)
PROTHROMBIN TIME: 16.5 SEC (ref 12–14.6)
PROTHROMBIN TIME: 17.1 SEC (ref 12–14.6)
RBC # BLD AUTO: 4.28 M/UL (ref 4.2–5.4)
RBC # BLD AUTO: 4.35 M/UL (ref 4.2–5.4)
RBC # BLD AUTO: 4.53 M/UL (ref 4.2–5.4)
SODIUM SERPL-SCNC: 130 MMOL/L (ref 135–145)
SODIUM SERPL-SCNC: 134 MMOL/L (ref 135–145)
SODIUM SERPL-SCNC: 135 MMOL/L (ref 135–145)
SODIUM SERPL-SCNC: 139 MMOL/L (ref 135–145)
TROPONIN T SERPL-MCNC: 22 NG/L (ref 6–19)
UFH PPP CHRO-ACNC: 0.27 IU/ML
UFH PPP CHRO-ACNC: 0.56 IU/ML
UFH PPP CHRO-ACNC: <0.1 IU/ML
WBC # BLD AUTO: 13.7 K/UL (ref 4.8–10.8)
WBC # BLD AUTO: 16.5 K/UL (ref 4.8–10.8)
WBC # BLD AUTO: 17.5 K/UL (ref 4.8–10.8)

## 2022-04-14 PROCEDURE — A9270 NON-COVERED ITEM OR SERVICE: HCPCS | Performed by: NURSE PRACTITIONER

## 2022-04-14 PROCEDURE — A9270 NON-COVERED ITEM OR SERVICE: HCPCS | Performed by: INTERNAL MEDICINE

## 2022-04-14 PROCEDURE — 36620 INSERTION CATHETER ARTERY: CPT

## 2022-04-14 PROCEDURE — 700105 HCHG RX REV CODE 258: Performed by: STUDENT IN AN ORGANIZED HEALTH CARE EDUCATION/TRAINING PROGRAM

## 2022-04-14 PROCEDURE — 02FR3Z0 FRAGMENTATION OF LEFT PULMONARY ARTERY, PERCUTANEOUS APPROACH, ULTRASONIC: ICD-10-PCS | Performed by: RADIOLOGY

## 2022-04-14 PROCEDURE — 700102 HCHG RX REV CODE 250 W/ 637 OVERRIDE(OP): Performed by: INTERNAL MEDICINE

## 2022-04-14 PROCEDURE — 03HY32Z INSERTION OF MONITORING DEVICE INTO UPPER ARTERY, PERCUTANEOUS APPROACH: ICD-10-PCS | Performed by: STUDENT IN AN ORGANIZED HEALTH CARE EDUCATION/TRAINING PROGRAM

## 2022-04-14 PROCEDURE — 02FQ3Z0 FRAGMENTATION OF RIGHT PULMONARY ARTERY, PERCUTANEOUS APPROACH, ULTRASONIC: ICD-10-PCS | Performed by: RADIOLOGY

## 2022-04-14 PROCEDURE — 700117 HCHG RX CONTRAST REV CODE 255: Performed by: RADIOLOGY

## 2022-04-14 PROCEDURE — 83735 ASSAY OF MAGNESIUM: CPT | Mod: 91

## 2022-04-14 PROCEDURE — 37212 THROMBOLYTIC VENOUS THERAPY: CPT

## 2022-04-14 PROCEDURE — 84100 ASSAY OF PHOSPHORUS: CPT | Mod: 91

## 2022-04-14 PROCEDURE — 85027 COMPLETE CBC AUTOMATED: CPT

## 2022-04-14 PROCEDURE — 700111 HCHG RX REV CODE 636 W/ 250 OVERRIDE (IP)

## 2022-04-14 PROCEDURE — 700111 HCHG RX REV CODE 636 W/ 250 OVERRIDE (IP): Performed by: RADIOLOGY

## 2022-04-14 PROCEDURE — 700102 HCHG RX REV CODE 250 W/ 637 OVERRIDE(OP): Performed by: HOSPITALIST

## 2022-04-14 PROCEDURE — 84484 ASSAY OF TROPONIN QUANT: CPT

## 2022-04-14 PROCEDURE — 36015 PLACE CATHETER IN ARTERY: CPT | Mod: LT

## 2022-04-14 PROCEDURE — 85730 THROMBOPLASTIN TIME PARTIAL: CPT

## 2022-04-14 PROCEDURE — 700111 HCHG RX REV CODE 636 W/ 250 OVERRIDE (IP): Performed by: HOSPITALIST

## 2022-04-14 PROCEDURE — 85520 HEPARIN ASSAY: CPT

## 2022-04-14 PROCEDURE — 93306 TTE W/DOPPLER COMPLETE: CPT | Mod: 26 | Performed by: INTERNAL MEDICINE

## 2022-04-14 PROCEDURE — 700111 HCHG RX REV CODE 636 W/ 250 OVERRIDE (IP): Performed by: STUDENT IN AN ORGANIZED HEALTH CARE EDUCATION/TRAINING PROGRAM

## 2022-04-14 PROCEDURE — 85025 COMPLETE CBC W/AUTO DIFF WBC: CPT

## 2022-04-14 PROCEDURE — 80053 COMPREHEN METABOLIC PANEL: CPT

## 2022-04-14 PROCEDURE — 80048 BASIC METABOLIC PNL TOTAL CA: CPT

## 2022-04-14 PROCEDURE — 93970 EXTREMITY STUDY: CPT

## 2022-04-14 PROCEDURE — 3E06317 INTRODUCTION OF OTHER THROMBOLYTIC INTO CENTRAL ARTERY, PERCUTANEOUS APPROACH: ICD-10-PCS | Performed by: RADIOLOGY

## 2022-04-14 PROCEDURE — 84145 PROCALCITONIN (PCT): CPT

## 2022-04-14 PROCEDURE — 93306 TTE W/DOPPLER COMPLETE: CPT

## 2022-04-14 PROCEDURE — 36620 INSERTION CATHETER ARTERY: CPT | Performed by: NURSE PRACTITIONER

## 2022-04-14 PROCEDURE — 85610 PROTHROMBIN TIME: CPT

## 2022-04-14 PROCEDURE — 700105 HCHG RX REV CODE 258: Performed by: RADIOLOGY

## 2022-04-14 PROCEDURE — 700102 HCHG RX REV CODE 250 W/ 637 OVERRIDE(OP): Performed by: STUDENT IN AN ORGANIZED HEALTH CARE EDUCATION/TRAINING PROGRAM

## 2022-04-14 PROCEDURE — 770000 HCHG ROOM/CARE - INTERMEDIATE ICU *

## 2022-04-14 PROCEDURE — 700102 HCHG RX REV CODE 250 W/ 637 OVERRIDE(OP): Performed by: NURSE PRACTITIONER

## 2022-04-14 PROCEDURE — 93970 EXTREMITY STUDY: CPT | Mod: 26 | Performed by: INTERNAL MEDICINE

## 2022-04-14 PROCEDURE — 82962 GLUCOSE BLOOD TEST: CPT

## 2022-04-14 PROCEDURE — 99254 IP/OBS CNSLTJ NEW/EST MOD 60: CPT | Performed by: HOSPITALIST

## 2022-04-14 PROCEDURE — 99152 MOD SED SAME PHYS/QHP 5/>YRS: CPT

## 2022-04-14 PROCEDURE — 75743 ARTERY X-RAYS LUNGS: CPT | Mod: XU

## 2022-04-14 RX ORDER — SODIUM CHLORIDE 9 MG/ML
500 INJECTION, SOLUTION INTRAVENOUS
Status: ACTIVE | OUTPATIENT
Start: 2022-04-14 | End: 2022-04-14

## 2022-04-14 RX ORDER — ONDANSETRON 2 MG/ML
4 INJECTION INTRAMUSCULAR; INTRAVENOUS PRN
Status: ACTIVE | OUTPATIENT
Start: 2022-04-14 | End: 2022-04-14

## 2022-04-14 RX ORDER — HEPARIN SODIUM 1000 [USP'U]/ML
40 INJECTION, SOLUTION INTRAVENOUS; SUBCUTANEOUS PRN
Status: DISCONTINUED | OUTPATIENT
Start: 2022-04-14 | End: 2022-04-15

## 2022-04-14 RX ORDER — HEPARIN SODIUM 1000 [USP'U]/ML
40 INJECTION, SOLUTION INTRAVENOUS; SUBCUTANEOUS PRN
Status: DISCONTINUED | OUTPATIENT
Start: 2022-04-14 | End: 2022-04-14

## 2022-04-14 RX ORDER — IODIXANOL 270 MG/ML
40 INJECTION, SOLUTION INTRAVASCULAR ONCE
Status: COMPLETED | OUTPATIENT
Start: 2022-04-14 | End: 2022-04-14

## 2022-04-14 RX ORDER — HEPARIN SODIUM 5000 [USP'U]/100ML
0-30 INJECTION, SOLUTION INTRAVENOUS CONTINUOUS
Status: DISCONTINUED | OUTPATIENT
Start: 2022-04-14 | End: 2022-04-14

## 2022-04-14 RX ORDER — SODIUM CHLORIDE 9 MG/ML
INJECTION, SOLUTION INTRAVENOUS CONTINUOUS
Status: ACTIVE | OUTPATIENT
Start: 2022-04-14 | End: 2022-04-14

## 2022-04-14 RX ORDER — MIDAZOLAM HYDROCHLORIDE 1 MG/ML
.5-2 INJECTION INTRAMUSCULAR; INTRAVENOUS PRN
Status: ACTIVE | OUTPATIENT
Start: 2022-04-14 | End: 2022-04-14

## 2022-04-14 RX ORDER — POTASSIUM CHLORIDE 20 MEQ/1
40 TABLET, EXTENDED RELEASE ORAL
Status: COMPLETED | OUTPATIENT
Start: 2022-04-14 | End: 2022-04-14

## 2022-04-14 RX ORDER — POTASSIUM CHLORIDE 20 MEQ/1
20 TABLET, EXTENDED RELEASE ORAL ONCE
Status: COMPLETED | OUTPATIENT
Start: 2022-04-14 | End: 2022-04-14

## 2022-04-14 RX ORDER — HEPARIN SODIUM 5000 [USP'U]/100ML
INJECTION, SOLUTION INTRAVENOUS
Status: COMPLETED
Start: 2022-04-14 | End: 2022-04-14

## 2022-04-14 RX ORDER — HEPARIN SODIUM 5000 [USP'U]/100ML
500 INJECTION, SOLUTION INTRAVENOUS CONTINUOUS
Status: ACTIVE | OUTPATIENT
Start: 2022-04-14 | End: 2022-04-14

## 2022-04-14 RX ORDER — DEXTROSE MONOHYDRATE 25 G/50ML
25 INJECTION, SOLUTION INTRAVENOUS
Status: DISCONTINUED | OUTPATIENT
Start: 2022-04-14 | End: 2022-04-16 | Stop reason: HOSPADM

## 2022-04-14 RX ORDER — HEPARIN SODIUM 5000 [USP'U]/100ML
0-30 INJECTION, SOLUTION INTRAVENOUS CONTINUOUS
Status: DISCONTINUED | OUTPATIENT
Start: 2022-04-14 | End: 2022-04-15

## 2022-04-14 RX ADMIN — MAGNESIUM SULFATE IN WATER 2 G: 40 INJECTION, SOLUTION INTRAVENOUS at 02:03

## 2022-04-14 RX ADMIN — HEPARIN SODIUM 2800 UNITS: 1000 INJECTION, SOLUTION INTRAVENOUS; SUBCUTANEOUS at 22:46

## 2022-04-14 RX ADMIN — IODIXANOL 40 ML: 270 INJECTION, SOLUTION INTRAVASCULAR at 10:30

## 2022-04-14 RX ADMIN — ALTEPLASE 1 MG/HR: KIT at 10:10

## 2022-04-14 RX ADMIN — INSULIN HUMAN 10 UNITS: 100 INJECTION, SUSPENSION SUBCUTANEOUS at 20:59

## 2022-04-14 RX ADMIN — HEPARIN SODIUM 18 UNITS/KG/HR: 5000 INJECTION, SOLUTION INTRAVENOUS at 14:49

## 2022-04-14 RX ADMIN — SODIUM CHLORIDE: 9 INJECTION, SOLUTION INTRAVENOUS at 10:10

## 2022-04-14 RX ADMIN — DEXTROSE AND SODIUM CHLORIDE: 10; .45 INJECTION, SOLUTION INTRAVENOUS at 10:43

## 2022-04-14 RX ADMIN — HEPARIN SODIUM 500 UNITS/HR: 5000 INJECTION, SOLUTION INTRAVENOUS at 10:10

## 2022-04-14 RX ADMIN — INSULIN HUMAN 10 UNITS: 100 INJECTION, SUSPENSION SUBCUTANEOUS at 13:40

## 2022-04-14 RX ADMIN — SODIUM CHLORIDE, POTASSIUM CHLORIDE, SODIUM LACTATE AND CALCIUM CHLORIDE: 600; 310; 30; 20 INJECTION, SOLUTION INTRAVENOUS at 01:40

## 2022-04-14 RX ADMIN — SODIUM CHLORIDE 18 UNITS/HR: 9 INJECTION, SOLUTION INTRAVENOUS at 07:59

## 2022-04-14 RX ADMIN — POTASSIUM CHLORIDE 40 MEQ: 20 TABLET, EXTENDED RELEASE ORAL at 12:50

## 2022-04-14 RX ADMIN — POTASSIUM CHLORIDE 20 MEQ: 20 TABLET, EXTENDED RELEASE ORAL at 02:03

## 2022-04-14 RX ADMIN — POTASSIUM CHLORIDE 40 MEQ: 20 TABLET, EXTENDED RELEASE ORAL at 14:40

## 2022-04-14 ASSESSMENT — COGNITIVE AND FUNCTIONAL STATUS - GENERAL
HELP NEEDED FOR BATHING: A LITTLE
WALKING IN HOSPITAL ROOM: A LITTLE
DRESSING REGULAR UPPER BODY CLOTHING: A LITTLE
DRESSING REGULAR LOWER BODY CLOTHING: A LITTLE
PERSONAL GROOMING: A LITTLE
SUGGESTED CMS G CODE MODIFIER DAILY ACTIVITY: CK
SUGGESTED CMS G CODE MODIFIER MOBILITY: CK
EATING MEALS: A LITTLE
DAILY ACTIVITIY SCORE: 18
MOBILITY SCORE: 18
TURNING FROM BACK TO SIDE WHILE IN FLAT BAD: A LITTLE
MOVING FROM LYING ON BACK TO SITTING ON SIDE OF FLAT BED: A LITTLE
CLIMB 3 TO 5 STEPS WITH RAILING: A LITTLE
MOVING TO AND FROM BED TO CHAIR: A LITTLE
STANDING UP FROM CHAIR USING ARMS: A LITTLE
TOILETING: A LITTLE

## 2022-04-14 ASSESSMENT — ENCOUNTER SYMPTOMS
DIARRHEA: 0
WEAKNESS: 0
VOMITING: 0
CHILLS: 0
FEVER: 0
HEADACHES: 0
SHORTNESS OF BREATH: 1
BLOOD IN STOOL: 0
COUGH: 0
ABDOMINAL PAIN: 0
NAUSEA: 0
CONSTIPATION: 0

## 2022-04-14 ASSESSMENT — LIFESTYLE VARIABLES
AVERAGE NUMBER OF DAYS PER WEEK YOU HAVE A DRINK CONTAINING ALCOHOL: 0
HOW MANY TIMES IN THE PAST YEAR HAVE YOU HAD 5 OR MORE DRINKS IN A DAY: 0
TOTAL SCORE: 0
ON A TYPICAL DAY WHEN YOU DRINK ALCOHOL HOW MANY DRINKS DO YOU HAVE: 0
ALCOHOL_USE: NO
EVER FELT BAD OR GUILTY ABOUT YOUR DRINKING: NO
TOTAL SCORE: 0
EVER HAD A DRINK FIRST THING IN THE MORNING TO STEADY YOUR NERVES TO GET RID OF A HANGOVER: NO
HAVE YOU EVER FELT YOU SHOULD CUT DOWN ON YOUR DRINKING: NO
CONSUMPTION TOTAL: NEGATIVE
TOTAL SCORE: 0
HAVE PEOPLE ANNOYED YOU BY CRITICIZING YOUR DRINKING: NO

## 2022-04-14 ASSESSMENT — PATIENT HEALTH QUESTIONNAIRE - PHQ9
3. TROUBLE FALLING OR STAYING ASLEEP OR SLEEPING TOO MUCH: NOT AT ALL
6. FEELING BAD ABOUT YOURSELF - OR THAT YOU ARE A FAILURE OR HAVE LET YOURSELF OR YOUR FAMILY DOWN: NOT AL ALL
1. LITTLE INTEREST OR PLEASURE IN DOING THINGS: NOT AT ALL
SUM OF ALL RESPONSES TO PHQ9 QUESTIONS 1 AND 2: 1
SUM OF ALL RESPONSES TO PHQ QUESTIONS 1-9: 1
9. THOUGHTS THAT YOU WOULD BE BETTER OFF DEAD, OR OF HURTING YOURSELF: NOT AT ALL
5. POOR APPETITE OR OVEREATING: NOT AT ALL
8. MOVING OR SPEAKING SO SLOWLY THAT OTHER PEOPLE COULD HAVE NOTICED. OR THE OPPOSITE, BEING SO FIGETY OR RESTLESS THAT YOU HAVE BEEN MOVING AROUND A LOT MORE THAN USUAL: NOT AT ALL
4. FEELING TIRED OR HAVING LITTLE ENERGY: NOT AT ALL
2. FEELING DOWN, DEPRESSED, IRRITABLE, OR HOPELESS: SEVERAL DAYS
7. TROUBLE CONCENTRATING ON THINGS, SUCH AS READING THE NEWSPAPER OR WATCHING TELEVISION: NOT AT ALL

## 2022-04-14 ASSESSMENT — FIBROSIS 4 INDEX: FIB4 SCORE: 1.08

## 2022-04-14 ASSESSMENT — PAIN DESCRIPTION - PAIN TYPE: TYPE: ACUTE PAIN

## 2022-04-14 NOTE — ASSESSMENT & PLAN NOTE
Acute respiratory failure with hypoxia secondary to PE  Wean off oxygen as tolerated  Anticoagulation post EKOS  Monitor O2 needs with ambulation.

## 2022-04-14 NOTE — CARE PLAN
The patient is Watcher - Medium risk of patient condition declining or worsening    Shift Goals  Clinical Goals: Decrease blood sugars, wean oxygen, improve shortness of breath  Patient Goals: Rest, see family  Family Goals: No family present at this time    Progress made toward(s) clinical / shift goals:  Insulin drip titrated off. Oxygen levels up and down but titrated appropriately. SOB improved per patient.

## 2022-04-14 NOTE — ASSESSMENT & PLAN NOTE
CT PE showed saddle pulmonary embolus with associated right heart strain and scattered bilateral pulmonary infarcts.  To score of 3.  @Plan:  -Admit to the ICU.  -Telemetry, continuous pulse oximetry, supplemental oxygen as needed.  -Heparin drip.  -Plan for EKOS in a.m.  -Keep patient NPO.

## 2022-04-14 NOTE — PROGRESS NOTES
Pulmonary and Critical Care Medicine Progress Note    Alis has undergone EKOS catheter directed thrombolysis.  She is doing well.  She is being transitioned off of her insulin drip to NPH and sliding scale insulin.  She is no longer critically ill.  She may be safely transferred to the [facility].    [facility] will sign off.  Please call if you have any questions.    Armani Bradley MD  Pulmonary and Critical Care Medicine

## 2022-04-14 NOTE — PROGRESS NOTES
Dr. Surya Villareal at bedside. Requested clarification on restarting insulin drip systemically after EKOS finished. MD will get back to RN after speaking with pharmacy.

## 2022-04-14 NOTE — PROCEDURES
"Arterial Line Insertion    Date/Time: 4/14/2022 1:53 AM  Performed by: ELISEO Peralta.  Authorized by: BOONE Peralta   Consent: Verbal consent obtained. Written consent obtained.  Risks and benefits: risks, benefits and alternatives were discussed  Consent given by: patient  Patient identity confirmed: arm band  Time out: Immediately prior to procedure a \"time out\" was called to verify the correct patient, procedure, equipment, support staff and site/side marked as required.  Preparation: Patient was prepped and draped in the usual sterile fashion.  Indications: hemodynamic monitoring  Location: left radial  Anesthesia: local infiltration    Anesthesia:  Local Anesthetic: lidocaine 1% without epinephrine    Sedation:  Patient sedated: no    Jose's test normal: yes  Needle gauge: 20  Seldinger technique: Seldinger technique used  Number of attempts: 1  Post-procedure: line sutured and dressing applied  Post-procedure CMS: normal  Patient tolerance: patient tolerated the procedure well with no immediate complications              "

## 2022-04-14 NOTE — PROGRESS NOTES
Pt to S121 with ACLS RN and CCT, placed on ICU bed and Monitor.     BP:92/68   HR:133  TEMP 97.7  Wt:116.6        4 Eyes Skin Assessment Completed by Zainab RN and DAVID Juárez.    Head WDL  Ears WDL  Nose WDL  Mouth WDL  Neck WDL  Breast/Chest WDL  Shoulder Blades WDL  Spine WDL  (R) Arm/Elbow/Hand WDL  (L) Arm/Elbow/Hand WDL  Abdomen WDL  Groin WDL  Scrotum/Coccyx/Buttocks WDL  (R) Leg WDL  (L) Leg WDL  (R) Heel/Foot/Toe WDL  (L) Heel/Foot/Toe WDL          Devices In Places ECG, Blood Pressure Cuff, Pulse Ox, SCD's and Nasal Cannula      Interventions In Place Pillows    Possible Skin Injury No    Pictures Uploaded Into Epic N/A  Wound Consult Placed N/A  RN Wound Prevention Protocol Ordered No

## 2022-04-14 NOTE — ED NOTES
Chief Complaint   Patient presents with   • Weakness   • Vomiting   • Hyperglycemia   • Hypotension     Agree with prior note. Pt AAOx4.  Blood drawn and sent to lab. Lab called for blood cultures.

## 2022-04-14 NOTE — DISCHARGE PLANNING
Anticipated Discharge Disposition: Home    Action: Work note provided to pt per pt's request.     Barriers to Discharge: None    Plan: Care coordination will continue to follow and assist with discharge planning

## 2022-04-14 NOTE — PROGRESS NOTES
Interim summary:    [facility] hospitalist consulted for [facility] admission.  After chart review and discussion with ERP, I had reached out to intensivist Dr. Stephen regarding possible escalation in care.  After chart review, determined that patient is significantly dehydrated and likely requiring much more fluids, IV insulin and bicarb drip as she has not had significant compensation on VBG within the past several hours.  ERP states 1 L bolus and 10 units IV insulin given already.  Specific gravity 1.035 on urinalysis and her sinus tachycardia indicates she likely is volume depleted.  I know there is concern for hypoxia however her chest x-ray is clear and BNP elevated so there is concern for possible pulmonary emboli and subsequent CTA currently commencing.  EKG reviewed and prior Q waves/inverted T waves on lead III however no S waves lead I and no significant heart strain on EKG and we will await results of CTA for PE rule out.  Dr. Stephen states she will take patient onto ICU for higher escalation of care for insulin drip and bicarb infusion at this time.    Electronically signed:  Brien London DO

## 2022-04-14 NOTE — PROGRESS NOTES
Dr Bradley notified that we are continuing increasing insulin drip.     Call from IR to place EKOS on pt. Okay per Dr. Bradley to move forward with this.

## 2022-04-14 NOTE — PROGRESS NOTES
Clarified orders with MD and pharmacist:    Give NPH - stop insulin drip and D10 0.45% NS 2 hours after administering NPH.    Start systemic heparin when EKOs infusions stop at 1410.

## 2022-04-14 NOTE — CONSULTS
Hospital Medicine Consultation    Date of Service  2022    Referring Physician  SALOMÓN Cline*    Consulting Physician  Tay Villareal M.D.    Reason for Consultation    Take over medical care    History of Presenting Illness  51 y.o. female who presented 2022 with morbid obesity and history of type 2 diabetes presented with dyspnea and generalized malaise and fatigue and noted to have hyperglycemia with mild DKA and saddle pulmonary embolism with right heart strain.  She was admitted to the intensive care unit started on insulin drip and heparin drip.  She underwent EKOS this morning on my evaluation she reports that her dyspnea is improved and she denies any chest pain.  She is thirsty and requesting to start a diet.  She denies any prior history of DVT/PE.  No recent travel or prolonged immobilization.  She has not been on any medications lately due to lack of medical coverage.  She was previously on Metformin    Review of Systems  Review of Systems   All other systems reviewed and are negative.      Past Medical History   has a past medical history of Anemia, Diabetes, and Obesity.    She has no past medical history of Hypertension.    Surgical History      Family History  Reviewed and not pertinent to the presenting problem  Social History   reports that she has never smoked. She has never used smokeless tobacco. She reports that she does not drink alcohol and does not use drugs.    Medications  Prior to Admission Medications   Prescriptions Last Dose Informant Patient Reported? Taking?   fluconazole (DIFLUCAN) 150 MG tablet 2022 at Unknown time Patient No No   Sig: Take 1 Tablet by mouth every day.   metformin (GLUCOPHAGE) 1000 MG tablet 2022 at 0930 Patient No No   Sig: Take 1 Tablet by mouth 2 times a day with meals.   ondansetron (ZOFRAN ODT) 4 MG TABLET DISPERSIBLE 2022 at Unknown time Patient No No   Sig: Take 1 Tablet by mouth every 6 hours as needed for  Nausea.      Facility-Administered Medications: None       Allergies  Allergies   Allergen Reactions   • Nkda [No Known Drug Allergy]        Physical Exam  Temp:  [36.2 °C (97.1 °F)-36.6 °C (97.8 °F)] 36.4 °C (97.5 °F)  Pulse:  [115-138] 121  Resp:  [0-33] 22  BP: ()/(55-88) 115/72  SpO2:  [89 %-100 %] 99 %    Physical Exam  Vitals and nursing note reviewed.   Constitutional:       General: She is not in acute distress.     Appearance: She is obese.   HENT:      Head: Normocephalic and atraumatic.      Nose: Nose normal. No rhinorrhea.      Mouth/Throat:      Pharynx: No oropharyngeal exudate or posterior oropharyngeal erythema.   Eyes:      General: No scleral icterus.        Right eye: No discharge.         Left eye: No discharge.   Cardiovascular:      Rate and Rhythm: Regular rhythm. Tachycardia present.      Heart sounds: Normal heart sounds. No murmur heard.    No friction rub. No gallop.   Pulmonary:      Effort: Pulmonary effort is normal. No respiratory distress.      Breath sounds: No stridor. Decreased breath sounds present. No wheezing, rhonchi or rales.   Chest:      Chest wall: No tenderness.   Abdominal:      General: Bowel sounds are normal. There is no distension.      Palpations: Abdomen is soft. There is no mass.      Tenderness: There is no abdominal tenderness. There is no rebound.   Musculoskeletal:         General: Swelling present. No tenderness.      Cervical back: Neck supple. No rigidity.   Skin:     General: Skin is warm and dry.      Coloration: Skin is not cyanotic or jaundiced.      Nails: There is no clubbing.   Neurological:      General: No focal deficit present.      Mental Status: She is alert and oriented to person, place, and time.      Cranial Nerves: No cranial nerve deficit.      Motor: No weakness.   Psychiatric:         Mood and Affect: Mood normal.         Behavior: Behavior normal.         Fluids  Date 04/14/22 0700 - 04/15/22 0659   Shift [id number] 8832-9784  1307-8365 24 Hour Total   INTAKE   I.V. 285.2   285.2   Shift Total 285.2   285.2   OUTPUT   Urine 60   60   Shift Total 60   60   Weight (kg) 116.6 116.6 116.6 116.6       Laboratory  Recent Labs     04/12/22  1823 04/13/22  1839 04/14/22  0535   WBC 10.7 14.8* 13.7*   RBC 4.96 4.77 4.53   HEMOGLOBIN 11.0* 10.6* 10.2*   HEMATOCRIT 37.9 37.5 34.0*   MCV 76.4* 78.6* 75.1*   MCH 22.2* 22.2* 22.5*   MCHC 29.0* 28.3* 30.0*   RDW 43.4 46.3 43.8   PLATELETCT 223 220 213   MPV 9.7 10.5 10.5     Recent Labs     04/14/22  0054 04/14/22  0535 04/14/22  1038   SODIUM 130* 134* 139   POTASSIUM 5.1 4.9 3.5*   CHLORIDE 99 103 109   CO2 15* 15* 16*   GLUCOSE 461* 401* 106*   BUN 22 29* 28*   CREATININE 1.01 1.27 1.06   CALCIUM 8.0* 8.4* 8.5     Recent Labs     04/13/22  2330   APTT 29.0   INR 1.31*                 Imaging  EC-ECHOCARDIOGRAM COMPLETE W/O CONT         IR-PULMONARY ANGIOGRAM-BILATERAL   Final Result      1.  BILATERAL selective pulmonary arteriograms showing segmental branch filling defects representing pulmonary emboli concordant with CT findings   2.  Placement of BILATERAL EKOS catheters   3.  Initiation of bilateral thrombolytic infusion with EKOS acoustic pulse thrombolysis therapy (US-assisted thrombolysis)      CT-CTA CHEST PULMONARY ARTERY W/ RECONS   Final Result      1.  Saddle pulmonary embolus with associated right heart strain.   2.  Scattered bilateral pulmonary infarcts. Small right pleural effusion.      Findings were communicated to CHRIS CAMILO via [employer] system on 4/13/2022 10:42 PM.      DX-CHEST-PORTABLE (1 VIEW)   Final Result      No acute cardiac or pulmonary abnormalities are identified.      US-EXTREMITY VENOUS LOWER BILAT    (Results Pending)       Assessment/Plan  * Saddle embolus of pulmonary artery (HCC)  Assessment & Plan  Unprovoked saddle PE    Status post EKOS  Start heparin drip post EKOS  Follow-up lower extremity duplex  Patient would benefit from routine screening for  malignancy as outpatient    Respiratory failure with hypoxia (HCC)  Assessment & Plan  Acute respiratory failure with hypoxia secondary to PE    Wean off oxygen as tolerated  Anticoagulation post EKOS    High anion gap metabolic acidosis  Assessment & Plan  Improved    DKA, type 1, not at goal (HCC)- (present on admission)  Assessment & Plan  DKA resolved  Part of her metabolic acidosis was likely related to hypoxia and PE    We will transition to NPH and regular sliding scale with close monitoring discussed with pharmacist

## 2022-04-14 NOTE — ASSESSMENT & PLAN NOTE
Unprovoked saddle PE  Status post EKOS 4/14  Was on heparin drip post EKOS and started Eliquis 4/15 am.  Left leg DVT  Needs outpatient follow up and discussed with patient.  Need to wean oxygen as able and if unable will need home O2.  Mobilize and assess home O2 needs  Discussed 4/15 no aspirin and no NSAID's (ibuprofen, advil, motrin, etc) to the patient while on Eliquis and avoid contact sports/activities.  Watch for bleeding.  Hgb:9.6 (still has her menses).

## 2022-04-14 NOTE — ASSESSMENT & PLAN NOTE
Likely secondary to DKA, poor compensation secondary to saddle PE.  Received a bolus of IV bicarb.  @Plan:  Continue bicarb drip.  See DKA for plan.

## 2022-04-14 NOTE — CONSULTS
PULMONARY AND CRITICAL CARE MEDICINE HISTORY AND PHYSICAL EXAMINATION    Date of Admission:  4/13/2022    Admitting Physician:  Marquita Stephen MD.    Reason for Admission:  Saddle pulmonary embolism + DKA.    Chief Complaint: Generalized weakness, exertional dyspnea.    History of Present Illness:  Alis Faye is a 51 years old female with PMH significant for type II DM (on Metformin; noncompliance because of shortness of breath) who presented to the ED with a complaint of fatigue,generalized weakness, and  exertional dyspnea.patient was seen on 4/12 for similar complaint and was treated as hyperglycemia and discharged home. Patient states this symptoms has been going on for 4 days prior to ED presentation, states shortness of breath is mainly exertional and during the first days she had dyspnea at rest which is no longer there.patient also reports chills, headache,and nausea. but denies any chest pain,palpitation,lower extremity swelling , change in bowel or urinary habits. Denies any history of traveling or prolonged bed ridden and no FH of blood clots.    In the ED patient vitals were remarkable for  , /87 , POX 92 on 4 L NC.  Labs were remarkable for anion gap of 22, CO2 14, blood glucose of 399 , ABG showed pH of 7.19, BHB of 2.98, UA showed no evidence of UTI, pending blood culture.  CT PE showed saddle pulmonary embolus with associated right heart strain and scattered bilateral pulmonary infarcts.CXR unremarkable.  EKG showed sinus tachycardia    Patient received a bolus of bicarb and fluids and started on heparin drip .christinetent will be admitted for evaluation and management of DKA and saddle pulmonary embolism. IR consulted and plan is for EKOS in the morning.      Medications Prior to Admission:    No current facility-administered medications on file prior to encounter.     Current Outpatient Medications on File Prior to Encounter   Medication Sig Dispense Refill   • metformin (GLUCOPHAGE)  1000 MG tablet Take 1 Tablet by mouth 2 times a day with meals. 60 Tablet 1   • ondansetron (ZOFRAN ODT) 4 MG TABLET DISPERSIBLE Take 1 Tablet by mouth every 6 hours as needed for Nausea. 10 Tablet 0   • [START ON 4/15/2022] fluconazole (DIFLUCAN) 150 MG tablet Take 1 Tablet by mouth every day. 1 Tablet 1       Current Medications:      Current Facility-Administered Medications:   •  senna-docusate (PERICOLACE or SENOKOT S) 8.6-50 MG per tablet 2 Tablet, 2 Tablet, Oral, BID **AND** polyethylene glycol/lytes (MIRALAX) PACKET 1 Packet, 1 Packet, Oral, QDAY PRN **AND** magnesium hydroxide (MILK OF MAGNESIA) suspension 30 mL, 30 mL, Oral, QDAY PRN **AND** bisacodyl (DULCOLAX) suppository 10 mg, 10 mg, Rectal, QDAY PRN, Brien S London, D.O.  •  acetaminophen (Tylenol) tablet 650 mg, 650 mg, Oral, Q6HRS PRN, Brien S London, D.O.  •  ondansetron (ZOFRAN) syringe/vial injection 4 mg, 4 mg, Intravenous, Q4HRS PRN, Brien S London, D.O.  •  ondansetron (ZOFRAN ODT) dispertab 4 mg, 4 mg, Oral, Q4HRS PRN, Brien S London, D.O.  •  promethazine (PHENERGAN) tablet 12.5-25 mg, 12.5-25 mg, Oral, Q4HRS PRN, Brien S London, D.O.  •  promethazine (PHENERGAN) suppository 12.5-25 mg, 12.5-25 mg, Rectal, Q4HRS PRN, Brien S London, D.O.  •  prochlorperazine (COMPAZINE) injection 5-10 mg, 5-10 mg, Intravenous, Q4HRS PRN, Brien S London, D.O.  •  guaiFENesin dextromethorphan (ROBITUSSIN DM) 100-10 MG/5ML syrup 10 mL, 10 mL, Oral, Q6HRS PRN, Brien S London, D.O.  •  [DISCONTINUED] insulin regular (HumuLIN R,NovoLIN R) injection, 3-14 Units, Subcutaneous, Q6HRS **AND** POC blood glucose manual result, , , Q6H **AND** NOTIFY MD and PharmD, , , Once **AND** Administer 20 grams of glucose (approximately 8 ounces of fruit juice) every 15 minutes PRN FSBG less than 70 mg/dL, , , PRN **AND** dextrose 50% (D50W) injection 25 g, 25 g, Intravenous, Q15 MIN PRN, Brien London D.O.  •  lactated ringers infusion (BOLUS), 1,000  mL, Intravenous, Once **OR** [DISCONTINUED] NS (BOLUS) infusion 2,000 mL, 2,000 mL, Intravenous, Once, Marquita Stephen M.D.  •  MD ALERT-PHARMACY TO CONSULT FOR DKA MONITORING 1 Each, 1 Each, Other, PRN, Marquita Stephen M.D.  •  magnesium sulfate IVPB premix 2 g, 2 g, Intravenous, Once PRN **OR** magnesium sulfate IVPB premix 4 g, 4 g, Intravenous, Once PRN, Marquita Stephen M.D.  •  potassium phosphate 30 mmol in  mL ivpb, 30 mmol, Intravenous, Once PRN **OR** sodium phosphate 30 mmol in 1/2  mL ivpb, 30 mmol, Intravenous, Once PRN, Marquita Stephen M.D.  •  Adult DKA potassium(K+) replacement scale, 1 Each, Intravenous, Q4HRS, Marquita Stephen M.D.  •  lactated ringers infusion, , Intravenous, Continuous, Marquita Stephen M.D., Last Rate: 150 mL/hr at 04/13/22 2335, New Bag at 04/13/22 2335  •  D5LR infusion, , Intravenous, Continuous, Marquita Stephen M.D., Held at 04/13/22 2300  •  D10%-0.45% NaCl infusion, , Intravenous, Continuous, Marquita Stephen M.D., Held at 04/13/22 2300  •  sodium bicarbonate 8.4 % 150 mEq in sterile water 1,000 mL infusion, , Intravenous, Continuous, Marquita Stephen M.D.  •  heparin infusion 25,000 units in 500 mL 0.45% NACL, 0-30 Units/kg/hr (Order-Specific), Intravenous, Continuous, Brien London, D.O., Last Rate: 26.3 mL/hr at 04/13/22 2341, 18 Units/kg/hr at 04/13/22 2341  •  heparin injection 2,900 Units, 40 Units/kg (Order-Specific), Intravenous, PRN, Brien London, D.O.  •  insulin regular (HumuLIN R) 100 Units in  mL Infusion for DKA, 6 Units/hr, Intravenous, Continuous, Marquita Stephen M.D., Last Rate: 6 mL/hr at 04/13/22 2334, 6 Units/hr at 04/13/22 2334  •  SODIUM BICARBONATE 8.4 % IV SOLN, , , ,     Allergies:    Nkda [no known drug allergy]    Past Surgical History:    History reviewed. No pertinent surgical history.    Past Medical History:    Past Medical History:   Diagnosis Date   • Anemia    • Diabetes    • Obesity        Social  "History:    Social History     Socioeconomic History   • Marital status: Single     Spouse name: Not on file   • Number of children: Not on file   • Years of education: Not on file   • Highest education level: Not on file   Occupational History   • Not on file   Tobacco Use   • Smoking status: Never Smoker   • Smokeless tobacco: Never Used   Vaping Use   • Vaping Use: Never used   Substance and Sexual Activity   • Alcohol use: No   • Drug use: No   • Sexual activity: Not on file   Other Topics Concern   • Not on file   Social History Narrative   • Not on file     Social Determinants of Health     Financial Resource Strain: Not on file   Food Insecurity: Not on file   Transportation Needs: Not on file   Physical Activity: Not on file   Stress: Not on file   Social Connections: Not on file   Intimate Partner Violence: Not on file   Housing Stability: Not on file       Family History:    No family history on file.    Review of System:    Negative except as mentioned in HPI.    Physical Examination:    /68   Pulse (!) 130   Temp 36.6 °C (97.8 °F) (Temporal)   Resp (!) 26   Ht 1.473 m (4' 10\")   Wt 116 kg (255 lb 11.7 oz)   LMP 03/18/2022   SpO2 90%   BMI 53.45 kg/m²   Physical Exam  Constitutional:       Appearance: Normal appearance. She is not diaphoretic.   HENT:      Head: Normocephalic and atraumatic.      Mouth/Throat:      Mouth: Mucous membranes are dry.      Pharynx: No oropharyngeal exudate or posterior oropharyngeal erythema.   Eyes:      Extraocular Movements: Extraocular movements intact.      Pupils: Pupils are equal, round, and reactive to light.   Cardiovascular:      Rate and Rhythm: Regular rhythm. Tachycardia present.      Heart sounds: Normal heart sounds. No murmur heard.  Pulmonary:      Breath sounds: Normal breath sounds. No wheezing.   Abdominal:      General: Bowel sounds are normal. There is no distension.      Palpations: Abdomen is soft.      Tenderness: There is no abdominal " tenderness.   Musculoskeletal:         General: No swelling or tenderness.      Cervical back: No rigidity or tenderness.   Skin:     Coloration: Skin is not jaundiced or pale.   Neurological:      Mental Status: She is alert and oriented to person, place, and time.      Motor: No weakness.   Psychiatric:         Mood and Affect: Mood normal.         Behavior: Behavior normal.         Laboratory Data:        Recent Labs     04/12/22 1823 04/13/22  1839   WBC 10.7 14.8*   RBC 4.96 4.77   HEMOGLOBIN 11.0* 10.6*   HEMATOCRIT 37.9 37.5   MCV 76.4* 78.6*   MCH 22.2* 22.2*   MCHC 29.0* 28.3*   RDW 43.4 46.3   PLATELETCT 223 220   MPV 9.7 10.5     Recent Labs     04/12/22 1823 04/13/22  1839   SODIUM 133* 131*   POTASSIUM 3.8 4.4   CHLORIDE 98 97   CO2 18* 14*   GLUCOSE 329* 541*   BUN 10 20   CREATININE 0.42* 0.91   CALCIUM 8.6 8.2*         Imaging:    I personally viewed all the available CXR and CT scan images as well as reviewed the radiology interpretation reports.    CT-CTA CHEST PULMONARY ARTERY W/ RECONS   Final Result      1.  Saddle pulmonary embolus with associated right heart strain.   2.  Scattered bilateral pulmonary infarcts. Small right pleural effusion.      Findings were communicated to CHRIS CAMILO via [employer] messaging system on 4/13/2022 10:42 PM.      DX-CHEST-PORTABLE (1 VIEW)   Final Result      No acute cardiac or pulmonary abnormalities are identified.      EC-ECHOCARDIOGRAM COMPLETE W/O CONT    (Results Pending)   US-EXTREMITY VENOUS LOWER BILAT    (Results Pending)   IR-CONSULT AND TREAT    (Results Pending)       Assessment and Plan:    * Saddle embolus of pulmonary artery (HCC)  Assessment & Plan  CT PE showed saddle pulmonary embolus with associated right heart strain and scattered bilateral pulmonary infarcts.  To score of 3.  @Plan:  - Admit to the ICU.  - Telemetry, continuous pulse oximetry, supplemental oxygen as needed.  - Heparin drip.  - Plan for EKOS in a.m.  - Keep patient NPO.  -  Pending Echocardiography.    Sinus tachycardia  Assessment & Plan  Likely secondary to pulmonary embolism.  @plan:  See PE for plan.     High anion gap metabolic acidosis  Assessment & Plan  Likely secondary to DKA, poor compensation secondary to saddle PE.  Received a bolus of IV bicarb.  @Plan:  Continue bicarb drip.  See DKA for plan.    DKA, type 1, not at goal (HCC)- (present on admission)  Assessment & Plan  Hx of type 2 DM. Medication non-compliance due to lost insurance.  BG on admission of ~500 , Anion gap of 20 , bicarb of 14 , BHB 2.98  Received 10 units of IV insulin in the ED,started on IV fluids and insulin drip for management of DKA given poor compensation in setting of saddle PE.  @plan:  - continue IV fluids.  - Insulin per DKA protocol.  - BMP + VBG q 4 hours.  - NPO for now.  - Monitor electrolytes and replete as needed.        High risk of deterioration and worsening vital organ dysfunction and death without the above critical care interventions.    The patient is critically ill.  Critical care time = 50 minutes in directly providing and coordinating critical care and extensive data review.  No time overlap and excludes procedures.

## 2022-04-14 NOTE — PROGRESS NOTES
[facility] Progress Note      Admit Date: 4/13/2022    Resident(s): Rainer Ni D.O.   Attending:  TAMMY LUNA/ Dr. Ramirez    Patient ID:    Name:  Alis Faye     YOB: 1970  Age:  51 y.o.  female   MRN:  [id number]    Hospital Course (carried forward and updated):  Alis Faye is a 51 y.o. female with PMHx morbid obesity BMI 50+ and IDDM who presented with fatigue, dyspnea on exertion and fatigue found to have very high glucose and BHB 2.98 concerning for DKA as well as CT findings of a saddle pulmonary embolism with right heart strain.    Consultants:  Critical Care  IR     Interval Events:    Patient feels better today than admission    NEURO:   Fully alert and oriented  CARDIOVASC:  HR ~120 Sinus tachycardia, //80  RESPIRATORY:  99% on 6L oxymask  GI/NUTRITION:  No diarrhea  RENAL/FLUID/LYTES: Cr 1.06 (baseline 0.4-0.6), outputs not documented, documented as 13L up  HEME/ONC:   On therapeutic heparin  ENDOCRINE:   Glucoses 400 this am on Insulin drip/DKA protocol    Vitals Range last 24h:  Temp:  [36.2 °C (97.1 °F)-36.6 °C (97.8 °F)] 36.4 °C (97.5 °F)  Pulse:  [116-138] 116  Resp:  [18-33] 19  BP: ()/(55-88) 100/70  SpO2:  [89 %-97 %] 90 %      Intake/Output Summary (Last 24 hours) at 4/14/2022 0855  Last data filed at 4/14/2022 0600  Gross per 24 hour   Intake 57958.14 ml   Output 100 ml   Net 84893.14 ml        Review of Systems   Constitutional: Negative for chills and fever.   Respiratory: Positive for shortness of breath. Negative for cough.    Cardiovascular: Positive for leg swelling. Negative for chest pain.   Gastrointestinal: Negative for abdominal pain, blood in stool, constipation, diarrhea, nausea and vomiting.   Skin: Negative for itching and rash.   Neurological: Negative for weakness and headaches.       PHYSICAL EXAM:  Vitals:    04/14/22 0400 04/14/22 0500 04/14/22 0600 04/14/22 0700   BP: (!) 99/68 (!) 97/63 110/71 100/70   Pulse: (!) 122  (!) 121 (!) 117 (!) 116   Resp: (!) 26 (!) 24 18 19   Temp: 36.2 °C (97.1 °F)  36.4 °C (97.5 °F)    TempSrc: Temporal  Temporal    SpO2: 93% 89% 89% 90%   Weight: 117 kg (257 lb 0.9 oz)      Height:        Body mass index is 53.72 kg/m².    O2 therapy: Pulse Oximetry: 90 %, O2 (LPM): 3, O2 Delivery Device: Silicone Nasal Cannula         Physical Exam  Constitutional:       General: She is not in acute distress.     Appearance: She is obese. She is not ill-appearing or toxic-appearing.   HENT:      Head: Normocephalic and atraumatic.      Mouth/Throat:      Mouth: Mucous membranes are dry.      Pharynx: No oropharyngeal exudate or posterior oropharyngeal erythema.   Neck:      Comments: Unable to assess JVD due to body habitus  Cardiovascular:      Rate and Rhythm: Regular rhythm. Tachycardia present.      Pulses: Normal pulses.      Heart sounds: No murmur heard.  Pulmonary:      Effort: Pulmonary effort is normal. No respiratory distress.      Breath sounds: Normal breath sounds. No wheezing or rales.   Abdominal:      General: Abdomen is flat. Bowel sounds are normal. There is no distension.      Palpations: Abdomen is soft.   Musculoskeletal:         General: Swelling (1+ bilateral LE) present.   Skin:     Findings: No erythema or rash.             Recent Labs     04/13/22 1839 04/14/22 0054 04/14/22  0535   SODIUM 131* 130* 134*   POTASSIUM 4.4 5.1 4.9   CHLORIDE 97 99 103   CO2 14* 15* 15*   BUN 20 22 29*   CREATININE 0.91 1.01 1.27   MAGNESIUM 1.8 1.7 2.0   PHOSPHORUS  --  3.5 3.7   CALCIUM 8.2* 8.0* 8.4*     Recent Labs     04/12/22 1823 04/13/22 1839 04/14/22  0054 04/14/22  0535   ALTSGPT 17 21 21  --    ASTSGOT 12 13 21  --    ALKPHOSPHAT 105* 105* 101*  --    TBILIRUBIN 0.4 0.5 0.5  --    GLUCOSE 329* 541* 461* 401*     Recent Labs     04/12/22 1823 04/13/22 1839 04/13/22  2330 04/14/22  0535   RBC 4.96 4.77  --  4.53   HEMOGLOBIN 11.0* 10.6*  --  10.2*   HEMATOCRIT 37.9 37.5  --  34.0*   PLATELETCT  223 220  --  213   PROTHROMBTM  --   --  15.9*  --    APTT  --   --  29.0  --    INR  --   --  1.31*  --      Recent Labs     04/12/22  1823 04/13/22  1839 04/14/22  0054 04/14/22  0535   WBC 10.7 14.8*  --  13.7*   NEUTSPOLYS 68.20 78.10*  --  75.20*   LYMPHOCYTES 21.10* 13.60*  --  14.60*   MONOCYTES 8.80 7.20  --  9.20   EOSINOPHILS 0.80 0.10  --  0.10   BASOPHILS 0.40 0.10  --  0.20   ASTSGOT 12 13 21  --    ALTSGPT 17 21 21  --    ALKPHOSPHAT 105* 105* 101*  --    TBILIRUBIN 0.4 0.5 0.5  --        Meds:  • MD Alert...Adult ICU Electrolyte Replacement per Pharmacy       • heparin       • NS  500 mL     • alteplase (ACTIVASE) infusion  1 mg/hr     • alteplase (ACTIVASE) infusion  1 mg/hr     • heparin  500 Units/hr     • NS       • NS       • NS       • fentaNYL  12.5-50 mcg     • midazolam  0.5-2 mg     • ondansetron  4 mg     • senna-docusate  2 Tablet      And   • polyethylene glycol/lytes  1 Packet      And   • magnesium hydroxide  30 mL      And   • bisacodyl  10 mg     • acetaminophen  650 mg     • ondansetron  4 mg     • ondansetron  4 mg     • promethazine  12.5-25 mg     • promethazine  12.5-25 mg     • prochlorperazine  5-10 mg     • guaiFENesin dextromethorphan  10 mL     • dextrose bolus  25 g     • MD ALERT-PHARMACY TO CONSULT  1 Each     • potassium phosphate ivpb  30 mmol      Or   • sodium phosphate 30 mmol ivpb  30 mmol     • Adult DKA potassium(K+) replacement scale  1 Each     • LR   100 mL/hr at 04/14/22 0312   • D5LR   Stopped (04/13/22 2300)   • D10%-0.45% NaCl   Stopped (04/13/22 2300)   • heparin  0-30 Units/kg/hr (Order-Specific) 18 Units/kg/hr (04/14/22 0706)   • heparin  40 Units/kg (Order-Specific)     • insulin infusion (for DKA ONLY)  6 Units/hr 18 Units/hr (04/14/22 0803)   • sodium bicarbonate            Procedures:  Pending EKOS today    Imaging:  CT-CTA CHEST PULMONARY ARTERY W/ RECONS   Final Result      1.  Saddle pulmonary embolus with associated right heart strain.   2.   Scattered bilateral pulmonary infarcts. Small right pleural effusion.      Findings were communicated to CHRIS CAMILO via [employer] system on 4/13/2022 10:42 PM.      DX-CHEST-PORTABLE (1 VIEW)   Final Result      No acute cardiac or pulmonary abnormalities are identified.      EC-ECHOCARDIOGRAM COMPLETE W/O CONT    (Results Pending)   US-EXTREMITY VENOUS LOWER BILAT    (Results Pending)   IR-PULMONARY ANGIOGRAM-BILATERAL    (Results Pending)       ASSESSEMENT and PLAN:    * Saddle embolus of pulmonary artery (HCC)  Assessment & Plan  Patient has saddle PE with unclear provoking factor  -not on anticoagulation prior    Plan:  -EKOS today    Sinus tachycardia  Assessment & Plan  Patient has sinus tachycardia 2/2 PE  -stable, not hemodynamically unstable    DKA, type 1, not at goal ([facility])- (present on admission)  Assessment & Plan  Patient is an insulin dependent diabetic also on metformin, it is unclear if paitent is type 1 or type 2 DKA prone  -does appear to be DKA with elevated BHB but also concomitant metabolic acidosis with improving DKA    Plan:  -As improves, can de-escalate DKA protocol later today    Respiratory failure with hypoxia (HCC)  Assessment & Plan  Patient still requiring oxygen  -secondary to massive PE    Plan:  -Pending echo  -EKOS    High anion gap metabolic acidosis  Assessment & Plan  Elevated gap and non gap acidosis secondary to PE/GI sx recently and DKA  -improving gap component      DISPO: If tolerates EKOS well, can transfer to [facility] status    CODE STATUS: Full    Quality Measures:  Feeding: oral  Analgesia: N/A  Sedation: N/A  Thromboprophylaxis: EKOS  Head of bed: >30 degrees  Ulcer prophylaxis: N/A  Glycemic control: DKA protocol  Bowel care: bowel regimen  Indwelling lines: EKOS catheter  Deescalation of antibiotics: N/A      Rainer Ni D.O.

## 2022-04-14 NOTE — ED NOTES
Pt medicated per MAR, Pt tolerated well. Family at bedside. Pt and family updated on pt's status.

## 2022-04-14 NOTE — PROGRESS NOTES
Late entry due to patient care:  1030 Pt back from IR.    Insulin infusing at 16units/hr.  Systemic heparin stopped in IR.    EKOS running through R groin.     Pt alert and oriented.

## 2022-04-14 NOTE — ED PROVIDER NOTES
ED Provider Note    Scribed for Danay Chaudhary M.D. by Vivek Ervin. 4/13/2022, 6:44 PM.    Primary care provider: No primary care provider noted.  Means of arrival: EMS  History obtained from: Patient  History limited by: None    CHIEF COMPLAINT  Chief Complaint   Patient presents with   • Weakness   • Vomiting   • Hyperglycemia   • Hypotension     HPI  Alis Faye is a 51 y.o. female who presents to the Emergency Department via EMS for acute, mild hyperglycemia onset four days ago. Per patient, she was seen here yesterday for vaginal yeast infection and hyperglycemia secondary to diabetes. Today, she began to experience increase in weakness and vomiting. She takes metformin for her history of diabetes. She has associated symptoms of chills, weakness, dyspnea on exertion, but denies shortness of breath, abdominal pain, chest pain, or fever. No alleviating or exacerbating factors reported. Denies being on oxygen at home, asthma, COPD, heart problems, or pulmonary embolism.    REVIEW OF SYSTEMS  Pertinent positives include hyperglycemia, weakness, vomiting, chills, hypotension, and dyspnea on exertion. Pertinent negatives include no shortness of breath, abdominal pain, chest pain, or fever. All other systems reviewed and negative.     PAST MEDICAL HISTORY   has a past medical history of Anemia, Diabetes, and Obesity.    SURGICAL HISTORY  patient denies any surgical history    SOCIAL HISTORY  Social History     Tobacco Use   • Smoking status: Never Smoker   • Smokeless tobacco: Never Used   Vaping Use   • Vaping Use: Never used   Substance Use Topics   • Alcohol use: No   • Drug use: No      Social History     Substance and Sexual Activity   Drug Use No       FAMILY HISTORY  No family history noted.    CURRENT MEDICATIONS  Home Medications     Reviewed by Ines Bailey R.N. (Registered Nurse) on 04/13/22 at 1837  Med List Status: Partial   Medication Last Dose Status   fluconazole (DIFLUCAN) 150 MG tablet  "4/12/2022 Active   hydrocodone-acetaminophen (NORCO) 5-325 MG TABS per tablet  Active   ibuprofen (MOTRIN) 800 MG TABS  Active   metformin (GLUCOPHAGE) 1000 MG tablet 4/13/2022 Active   ondansetron (ZOFRAN ODT) 4 MG TABLET DISPERSIBLE  Active              ALLERGIES  Allergies   Allergen Reactions   • Nkda [No Known Drug Allergy]      PHYSICAL EXAM  VITAL SIGNS: /87   Pulse (!) 130   Temp 36.6 °C (97.8 °F) (Temporal)   Resp 18   Ht 1.473 m (4' 10\")   Wt 113 kg (249 lb 1.9 oz)   LMP 03/18/2022   SpO2 92%   BMI 52.07 kg/m²     Constitutional: Well developed, Looks uncomfortable, moderate acute distress, Non-toxic appearance.   HENT: Normocephalic, Atraumatic, Bilateral external ears normal, Dry mucosa, Nose normal.   Eyes: PERRL, EOMI, Conjunctiva normal.   Neck: Normal range of motion, No tenderness, Supple.    Cardiovascular: Tachycardic heart rate, Normal rhythm.   Thorax & Lungs: Coarse breath sounds, Mild respiratory distress. No wheezing, No chest tenderness.  Abdomen: Benign abdominal exam, no tenderness, no distention, no guarding, no rebound.   Skin: Warm, Dry, No erythema, No rash.   Back: No tenderness, No CVA tenderness.   Extremities: Intact distal pulses, No edema, No calf tenderness   Neurologic: Alert & oriented x 3, Normal motor function, Normal sensory function, No focal deficits noted.  Psychiatric: Appropriate                                                     DIAGNOSTIC STUDIES / PROCEDURES\    LABS  Results for orders placed or performed during the hospital encounter of 04/13/22   Lactic acid (lactate)   Result Value Ref Range    Lactic Acid 3.5 (H) 0.5 - 2.0 mmol/L   Lactic acid (lactate): Repeat if initial lactic acid result is greater than 2   Result Value Ref Range    Lactic Acid 3.6 (H) 0.5 - 2.0 mmol/L   CBC WITH DIFFERENTIAL   Result Value Ref Range    WBC 14.8 (H) 4.8 - 10.8 K/uL    RBC 4.77 4.20 - 5.40 M/uL    Hemoglobin 10.6 (L) 12.0 - 16.0 g/dL    Hematocrit 37.5 37.0 - " 47.0 %    MCV 78.6 (L) 81.4 - 97.8 fL    MCH 22.2 (L) 27.0 - 33.0 pg    MCHC 28.3 (L) 33.6 - 35.0 g/dL    RDW 46.3 35.9 - 50.0 fL    Platelet Count 220 164 - 446 K/uL    MPV 10.5 9.0 - 12.9 fL    Neutrophils-Polys 78.10 (H) 44.00 - 72.00 %    Lymphocytes 13.60 (L) 22.00 - 41.00 %    Monocytes 7.20 0.00 - 13.40 %    Eosinophils 0.10 0.00 - 6.90 %    Basophils 0.10 0.00 - 1.80 %    Immature Granulocytes 0.90 0.00 - 0.90 %    Nucleated RBC 0.50 /100 WBC    Neutrophils (Absolute) 11.55 (H) 2.00 - 7.15 K/uL    Lymphs (Absolute) 2.01 1.00 - 4.80 K/uL    Monos (Absolute) 1.07 (H) 0.00 - 0.85 K/uL    Eos (Absolute) 0.01 0.00 - 0.51 K/uL    Baso (Absolute) 0.02 0.00 - 0.12 K/uL    Immature Granulocytes (abs) 0.13 (H) 0.00 - 0.11 K/uL    NRBC (Absolute) 0.08 K/uL   COMP METABOLIC PANEL   Result Value Ref Range    Sodium 131 (L) 135 - 145 mmol/L    Potassium 4.4 3.6 - 5.5 mmol/L    Chloride 97 96 - 112 mmol/L    Co2 14 (L) 20 - 33 mmol/L    Anion Gap 20.0 (H) 7.0 - 16.0    Glucose 541 (HH) 65 - 99 mg/dL    Bun 20 8 - 22 mg/dL    Creatinine 0.91 0.50 - 1.40 mg/dL    Calcium 8.2 (L) 8.5 - 10.5 mg/dL    AST(SGOT) 13 12 - 45 U/L    ALT(SGPT) 21 2 - 50 U/L    Alkaline Phosphatase 105 (H) 30 - 99 U/L    Total Bilirubin 0.5 0.1 - 1.5 mg/dL    Albumin 3.6 3.2 - 4.9 g/dL    Total Protein 7.3 6.0 - 8.2 g/dL    Globulin 3.7 (H) 1.9 - 3.5 g/dL    A-G Ratio 1.0 g/dL   BETA-HYDROXYBUTYRIC ACID   Result Value Ref Range    beta-Hydroxybutyric Acid 2.98 (H) 0.02 - 0.27 mmol/L   VENOUS BLOOD GAS   Result Value Ref Range    Venous Bg Ph 7.18 (L) 7.31 - 7.45    Venous Bg Ph Temp Corrected 7.20 (L) 7.31 - 7.45    Venous Bg Pco2 41.4 41.0 - 51.0 mmHg    Venous Bg Pco2 Temp Corrected 39.6 (L) 41.0 - 51.0 mmHg    Venous Bg Po2 18.9 (L) 25.0 - 40.0 mmHg    Venous Bg Po2 Temp Corrected 17.6 (L) 25.0 - 40.0 mmHg    Venous Bg O2 Saturation 16.9 %    Venous Bg Hco3 15 (L) 24 - 28 mmol/L    Venous Bg Base Excess -12 mmol/L    Body Temp 36.2 Centigrade    proBrain Natriuretic Peptide, NT   Result Value Ref Range    NT-proBNP 3961 (H) 0 - 125 pg/mL   TROPONIN   Result Value Ref Range    Troponin T 17 6 - 19 ng/L   MAGNESIUM   Result Value Ref Range    Magnesium 1.8 1.5 - 2.5 mg/dL   ESTIMATED GFR   Result Value Ref Range    GFR (CKD-EPI) 77 >60 mL/min/1.73 m 2   POCT glucose device results   Result Value Ref Range    POC Glucose, Blood 410 (HH) 65 - 99 mg/dL     All labs reviewed by me.    EKG  12 lead EKG interpreted by me as noted above.    RADIOLOGY  DX-CHEST-PORTABLE (1 VIEW)   Final Result      No acute cardiac or pulmonary abnormalities are identified.        The radiologist's interpretation of all radiological studies have been reviewed by me.    COURSE & MEDICAL DECISION MAKING  Nursing notes, VS, PMSFHx reviewed in chart.     Patient presents to the emergency department with weakness and vomiting and feeling ill.  Patient is a diabetic.  She is tachycardic here and looks dehydrated.  Blood sugar that was done read high.  Seen here yesterday for elevated blood sugar and treated with fluids and insulin.  Has not taken any of her meds since she went home.  Patient is also noted to be hypoxic on arrival here.  Patient not normally on any oxygen.  Denies fever cough to suggest an infectious etiology but will check and evaluate for pneumonia.  Chest x-ray yesterday was negative.  Patient does have an increased respiratory rate but it is unclear to me if this is related to her DKA versus another etiology.    6:44 PM - Patient seen and examined at bedside. The patient presents with acute, mild hyperglycemia and the differential diagnosis includes but is not limited to DKA vs. Sepsis vs. Pneumonia vs. CHF vs PE. Ordered for DX-Chest, Lactic Acid x2, BNP, Troponin, Beta-Hydroxybutyric Acid, Venous Blood Gas, Magnesium, CBC w/ diff, CMP, UA, Urine Culture, Blood Culture x2, and EKG to evaluate. Patient was treated with Zofran 4 mg injection and IV Fluids for her  symptoms.    Patient's lactic acid is 3.6.  She does have an elevated white count of 14.8 and evidence of mild anemia with a hemoglobin of 10.6.  Patient has a CO2 of 14.  Anion gap is 20.  Glucose is 541.  Beta hydroxybutyric acid is 3.  VBG was done with a pH of 7.18.  BNP is elevated at 3961.  Troponin is normal.  Chest x-ray was unremarkable.  At this point I do not have any etiology for the patient's hypoxia and tachycardia.  Troponin is normal.  EKG does not show evidence of acute MI.  Therefore I think PE goes higher on the differential.  We will treat her DKA with fluids and insulin.    7:57 PM - Patient was treated with insulin injection and IV Fluids for her symptoms.    9:08 PM - Patient was reevaluated at bedside. I informed her of my plan to admit her for further evaluation. Patient agrees to the plan of care. Patient was treated with Lasix 20 mg injection for her elevated BNP especially since her been giving her a lot of fluids.    9:19 PM - Paged Hospitalist    9:25 PM - I discussed the patient's case and the above findings with Dr. Fleming (Hospitalist) who recommends admitting the patient to the ICU.    9:29 PM - Paged Pulmonary    9:30 PM - I discussed the patient's case and the above findings with Dr. Pugh (Pulmonary) who agrees to see the patient for further evaluation.    10:00 PM - Patient was reevaluated at bedside. Pulmonary recommends paging Hospitalist as the patient currently meets criteria for stepdown.    10:03 PM - Paged Hospitalist    10:06 PM - I discussed the patient's case and the above findings with Dr. London (Hospitalist) who agrees to admit the patient for further evaluation.    10:45 PM patient was found to have a saddle PE with right heart strain.  Therefore now Dr. Stephen of pulmonology/ICU will hospitalize the patient.  Patiently placed on heparin.  We needed to get a good weight on the patient prior to starting the heparin drip.  It was decided she can be weighed in the ICU  and then heparin started.    CRITICAL CARE  The very real possibility of a deterioration of this patient's condition required the highest level of my preparedness for sudden, emergent intervention.  I provided critical care services, which included medication orders, frequent reevaluations of the patient's condition and response to treatment, ordering and reviewing test results, and discussing the case with various consultants.  The critical care time associated with the care of the patient was 45 minutes. Review chart for interventions. This time is exclusive of any other billable procedures.     HYDRATION: Based on the patient's presentation of DKA the patient was given IV fluids. IV Hydration was used because oral hydration was not adequate alone. Upon recheck following hydration, the patient was improved.    DISPOSITION:  Patient will be hospitalized by Dr. Stephen in critical condition.     FINAL IMPRESSION  1. Diabetic ketoacidosis without coma associated with type 2 diabetes mellitus (HCC)     2. Acute saddle pulmonary embolism with acute cor pulmonale (HCC)       3.      Critical care time was 45 minutes, as noted above.    Vivek SADLER (Cata), am scribing for, and in the presence of, Danay Chaudhary M.D..    Electronically signed by: Vivek Ervin (Cata), 4/13/2022    Danay SADLER M.D. personally performed the services described in this documentation, as scribed by Vivek Ervin in my presence, and it is both accurate and complete.    The note accurately reflects work and decisions made by me.  Danay Chaudhary M.D.  4/13/2022  11:04 PM

## 2022-04-14 NOTE — OR SURGEON
Immediate Post- Operative Note        Findings: Bilateral PE      Procedure(s): Bilateral EKOS Cath Placement      Estimated Blood Loss: Less than 5 ml        Complications: None            4/14/2022     12:11 PM     Christian Mac M.D.

## 2022-04-14 NOTE — PROGRESS NOTES
Discussed with patient again, she states she actually went on a long car trip to [place] for several hours there and back last month after a nephew . Following that she noticed left leg swelling but wasn't sure why she had it and it went away. 2 days ago she began feeling short of breath when walking that got progressively worse.

## 2022-04-14 NOTE — ASSESSMENT & PLAN NOTE
DKA resolved  Part of her metabolic acidosis was likely related to hypoxia and PE  NPH 10 units SQ BID  Hold home metformin  4/15 A1c:11.8  Encouraged lifestyle modification 4/15.

## 2022-04-14 NOTE — PROGRESS NOTES
Interim summary:    CTA results still pending however after personal review I have seen pulmonary emboli as seen below.  Heparin infusion starting subsequently.        Electronically signed:  Brien Lonodn DO

## 2022-04-14 NOTE — ASSESSMENT & PLAN NOTE
- IV fluids.  -Insulin per DKA protocol.  -NPO for now.  -Monitor electrolytes and replete as needed.  -

## 2022-04-14 NOTE — CONSULTS
Consults   51 year old female presented with N/V and SOB - found to have LA 3.5, BHB acid 2.98, bicarb 14, AG 20, repeat glucose 399. UA is not back yet.     Clinically she is well appearing, no distress, sinus tachycardia, A+Ox 3, NC oxygen, no pitting edema, cap refill 2 seconds    Agree with CTPA; low threshold for empiric abx. She will need SQ insulin. Recommend formal ECHO. She is ok for IMCU for close monitoring - unlikely more then mild DKA so no need for DKA protocol. If she worsens may require transfer to ICU.    Miguel Angel Pugh M.D.

## 2022-04-14 NOTE — ASSESSMENT & PLAN NOTE
CT PE showed saddle pulmonary embolus with associated right heart strain and scattered bilateral pulmonary infarcts.  To score of 3.  @Plan:  - Admit to the ICU.  - Telemetry, continuous pulse oximetry, supplemental oxygen as needed.  - Heparin drip.  - Plan for EKOS in a.m.  - Keep patient NPO.  - Pending Echocardiography.

## 2022-04-14 NOTE — PROGRESS NOTES
Pt presents to IR. Pt was consented by MD at bedside, confirmed by this RN and consent at bedside. Pt transferred to IR table in supine position. Patient underwent a pulmonary angiogram with thrombolysis catheter placement (EKOS) by Dr. Mac. Procedure site was marked by MD and verified using imaging guidance. Non-sedation case per pt request - local only. Pt placed on monitor, prepped and draped in a sterile fashion. Vitals were taken every 5 minutes and remained stable during procedure (see doc flow sheet for results). CO2 waveform capnography was monitored and remained WNL throughout procedure. Report called to Gisela HERNANDEZ. Pt transported by marisela with RN to S121.

## 2022-04-14 NOTE — DIETARY
NUTRITION SERVICES: BMI - Pt with BMI >40 (=Body mass index is 53.72 kg/m².), Class III obesity. Weight loss counseling not appropriate in acute care setting.  Consult for Diabetes diet education. Pt accepted Type 2 Diabetes booklet, but not feeling up to discussing at this time. RD to follow-up for diet education as appropriate.  RECOMMEND - If appropriate at DC please refer to outpatient nutrition services for weight management.

## 2022-04-14 NOTE — ASSESSMENT & PLAN NOTE
Likely secondary to saddle pulmonary embolism.saturation improved to 92% on 4 L NC.  @plan:  - continuous pulse oxymetry.  - supplemental oxygen as needed.  - see pulmonary embolism for plan.

## 2022-04-14 NOTE — PROGRESS NOTES
EKOS infusion stopped at 1425.    EKOS total run time 4:11:02    EKOS and sheath discontinued per order.

## 2022-04-14 NOTE — PROGRESS NOTES
Called IR again for EKOS post procedure orders such as catheter dc orders, lab orders, etc. IR RN will speak to radiologist.

## 2022-04-14 NOTE — ED NOTES
Assumed care of pt, pt lying in bed in a position of comfort. Pt denies any needs at this time. Will continue to monitor.

## 2022-04-14 NOTE — ED TRIAGE NOTES
[facility] EMS from home with   Chief Complaint   Patient presents with   • Weakness   • Vomiting   • Hyperglycemia   • Hypotension   Per pt report, seen yesterday for vaginal yeast infection and hyperglycemia.  Today pt c/o increasing weakness and vomiting. EMS found pt to be tachycardic with HR of 130; BP 88/62; and RA saturation 83%. Pt received 700 mL NS bolus; 4 mg zofran; placed on 4 liters NC and is now 97%.     Sepsis score of 3. Blood drawn and sent to lab.

## 2022-04-14 NOTE — ASSESSMENT & PLAN NOTE
Hx of type 2 DM. Medication non-compliance due to lost insurance.  BG on admission of ~500 , Anion gap of 20 , bicarb of 14 , BHB 2.98  Received 10 units of IV insulin in the ED,started on IV fluids and insulin drip for management of DKA given poor compensation in setting of saddle PE.  @plan:  - continue IV fluids.  - Insulin per DKA protocol.  - BMP + VBG q 4 hours.  - NPO for now.  - Monitor electrolytes and replete as needed.

## 2022-04-14 NOTE — PROGRESS NOTES
0891 Pt transported to IR with ICU RN and radiology tech on ICU monitor.    Report off given to IR RN. Discussed DKA protocol and drips.    Pt spoke with Dr. Mac regarding procedure.

## 2022-04-14 NOTE — ASSESSMENT & PLAN NOTE
Likely secondary to DKA, poor compensation secondary to saddle PE.  Received a bolus of IV bicarb and was started on bicarb drip.  @Plan:  - Hold off on bicarb drip for now given improvement in Ph to 7.3.  - we will consider restarting bicarb drip if worsening Ph.  - See DKA for plan.

## 2022-04-14 NOTE — ED NOTES
Medicated pt per MAR. Pt tolerated well. Applied Purewick and sent UA. Pt denies any needs at this time.

## 2022-04-15 PROBLEM — I26.99 PULMONARY EMBOLISM, BILATERAL (HCC): Status: ACTIVE | Noted: 2022-04-15

## 2022-04-15 PROBLEM — E66.01 MORBID OBESITY WITH BODY MASS INDEX OF 50 OR HIGHER (HCC): Status: ACTIVE | Noted: 2022-04-15

## 2022-04-15 PROBLEM — I82.402 LEG DVT (DEEP VENOUS THROMBOEMBOLISM), ACUTE, LEFT (HCC): Status: ACTIVE | Noted: 2022-04-15

## 2022-04-15 PROBLEM — D64.9 ANEMIA: Status: ACTIVE | Noted: 2022-04-15

## 2022-04-15 LAB
ALBUMIN SERPL BCP-MCNC: 3.3 G/DL (ref 3.2–4.9)
ALBUMIN/GLOB SERPL: 0.9 G/DL
ALP SERPL-CCNC: 101 U/L (ref 30–99)
ALT SERPL-CCNC: 28 U/L (ref 2–50)
ANION GAP SERPL CALC-SCNC: 12 MMOL/L (ref 7–16)
ANION GAP SERPL CALC-SCNC: 14 MMOL/L (ref 7–16)
ANISOCYTOSIS BLD QL SMEAR: ABNORMAL
AST SERPL-CCNC: 22 U/L (ref 12–45)
BACTERIA UR CULT: ABNORMAL
BACTERIA UR CULT: ABNORMAL
BASOPHILS # BLD AUTO: 0 % (ref 0–1.8)
BASOPHILS # BLD: 0 K/UL (ref 0–0.12)
BILIRUB SERPL-MCNC: 0.5 MG/DL (ref 0.1–1.5)
BUN SERPL-MCNC: 28 MG/DL (ref 8–22)
BUN SERPL-MCNC: 29 MG/DL (ref 8–22)
CALCIUM SERPL-MCNC: 8.1 MG/DL (ref 8.5–10.5)
CALCIUM SERPL-MCNC: 8.3 MG/DL (ref 8.5–10.5)
CHLORIDE SERPL-SCNC: 105 MMOL/L (ref 96–112)
CHLORIDE SERPL-SCNC: 106 MMOL/L (ref 96–112)
CO2 SERPL-SCNC: 17 MMOL/L (ref 20–33)
CO2 SERPL-SCNC: 17 MMOL/L (ref 20–33)
CREAT SERPL-MCNC: 0.83 MG/DL (ref 0.5–1.4)
CREAT SERPL-MCNC: 0.85 MG/DL (ref 0.5–1.4)
EOSINOPHIL # BLD AUTO: 0.13 K/UL (ref 0–0.51)
EOSINOPHIL NFR BLD: 0.9 % (ref 0–6.9)
ERYTHROCYTE [DISTWIDTH] IN BLOOD BY AUTOMATED COUNT: 46.9 FL (ref 35.9–50)
EST. AVERAGE GLUCOSE BLD GHB EST-MCNC: 292 MG/DL
FERRITIN SERPL-MCNC: 13.2 NG/ML (ref 10–291)
GFR SERPLBLD CREATININE-BSD FMLA CKD-EPI: 83 ML/MIN/1.73 M 2
GFR SERPLBLD CREATININE-BSD FMLA CKD-EPI: 86 ML/MIN/1.73 M 2
GLOBULIN SER CALC-MCNC: 3.5 G/DL (ref 1.9–3.5)
GLUCOSE BLD STRIP.AUTO-MCNC: 115 MG/DL (ref 65–99)
GLUCOSE BLD STRIP.AUTO-MCNC: 117 MG/DL (ref 65–99)
GLUCOSE BLD STRIP.AUTO-MCNC: 153 MG/DL (ref 65–99)
GLUCOSE BLD STRIP.AUTO-MCNC: 171 MG/DL (ref 65–99)
GLUCOSE BLD STRIP.AUTO-MCNC: 188 MG/DL (ref 65–99)
GLUCOSE BLD STRIP.AUTO-MCNC: 202 MG/DL (ref 65–99)
GLUCOSE SERPL-MCNC: 131 MG/DL (ref 65–99)
GLUCOSE SERPL-MCNC: 145 MG/DL (ref 65–99)
HBA1C MFR BLD: 11.8 % (ref 4–5.6)
HCT VFR BLD AUTO: 33.2 % (ref 37–47)
HGB BLD-MCNC: 9.6 G/DL (ref 12–16)
HGB RETIC QN AUTO: 21.9 PG/CELL (ref 29–35)
HYPOCHROMIA BLD QL SMEAR: ABNORMAL
IMM RETICS NFR: 34.6 % (ref 9.3–17.4)
IRON SATN MFR SERPL: 3 % (ref 15–55)
IRON SERPL-MCNC: 10 UG/DL (ref 40–170)
LYMPHOCYTES # BLD AUTO: 2.65 K/UL (ref 1–4.8)
LYMPHOCYTES NFR BLD: 17.9 % (ref 22–41)
MACROCYTES BLD QL SMEAR: ABNORMAL
MAGNESIUM SERPL-MCNC: 2.1 MG/DL (ref 1.5–2.5)
MANUAL DIFF BLD: NORMAL
MCH RBC QN AUTO: 22.3 PG (ref 27–33)
MCHC RBC AUTO-ENTMCNC: 28.9 G/DL (ref 33.6–35)
MCV RBC AUTO: 77.2 FL (ref 81.4–97.8)
MICROCYTES BLD QL SMEAR: ABNORMAL
MONOCYTES # BLD AUTO: 0.5 K/UL (ref 0–0.85)
MONOCYTES NFR BLD AUTO: 3.4 % (ref 0–13.4)
MORPHOLOGY BLD-IMP: NORMAL
MYELOCYTES NFR BLD MANUAL: 0.9 %
NEUTROPHILS # BLD AUTO: 11.38 K/UL (ref 2–7.15)
NEUTROPHILS NFR BLD: 76.9 % (ref 44–72)
NRBC # BLD AUTO: 0.26 K/UL
NRBC BLD-RTO: 1.8 /100 WBC
PHOSPHATE SERPL-MCNC: 3.5 MG/DL (ref 2.5–4.5)
PLATELET # BLD AUTO: 188 K/UL (ref 164–446)
PLATELET BLD QL SMEAR: NORMAL
PMV BLD AUTO: 11.3 FL (ref 9–12.9)
POLYCHROMASIA BLD QL SMEAR: NORMAL
POTASSIUM SERPL-SCNC: 4.3 MMOL/L (ref 3.6–5.5)
POTASSIUM SERPL-SCNC: 4.9 MMOL/L (ref 3.6–5.5)
PROT SERPL-MCNC: 6.8 G/DL (ref 6–8.2)
RBC # BLD AUTO: 4.3 M/UL (ref 4.2–5.4)
RBC BLD AUTO: PRESENT
RETICS # AUTO: 0.12 M/UL (ref 0.04–0.06)
RETICS/RBC NFR: 2.8 % (ref 0.8–2.1)
ROULEAUX BLD QL SMEAR: SLIGHT
SIGNIFICANT IND 70042: ABNORMAL
SITE SITE: ABNORMAL
SODIUM SERPL-SCNC: 134 MMOL/L (ref 135–145)
SODIUM SERPL-SCNC: 137 MMOL/L (ref 135–145)
SOURCE SOURCE: ABNORMAL
TIBC SERPL-MCNC: 298 UG/DL (ref 250–450)
UFH PPP CHRO-ACNC: 0.31 IU/ML
UIBC SERPL-MCNC: 288 UG/DL (ref 110–370)
WBC # BLD AUTO: 14.8 K/UL (ref 4.8–10.8)

## 2022-04-15 PROCEDURE — 700111 HCHG RX REV CODE 636 W/ 250 OVERRIDE (IP): Performed by: STUDENT IN AN ORGANIZED HEALTH CARE EDUCATION/TRAINING PROGRAM

## 2022-04-15 PROCEDURE — 82962 GLUCOSE BLOOD TEST: CPT

## 2022-04-15 PROCEDURE — 83036 HEMOGLOBIN GLYCOSYLATED A1C: CPT

## 2022-04-15 PROCEDURE — 99233 SBSQ HOSP IP/OBS HIGH 50: CPT | Performed by: HOSPITALIST

## 2022-04-15 PROCEDURE — 85025 COMPLETE CBC W/AUTO DIFF WBC: CPT

## 2022-04-15 PROCEDURE — 82728 ASSAY OF FERRITIN: CPT

## 2022-04-15 PROCEDURE — 700102 HCHG RX REV CODE 250 W/ 637 OVERRIDE(OP): Performed by: HOSPITALIST

## 2022-04-15 PROCEDURE — 83540 ASSAY OF IRON: CPT

## 2022-04-15 PROCEDURE — 85046 RETICYTE/HGB CONCENTRATE: CPT

## 2022-04-15 PROCEDURE — 84100 ASSAY OF PHOSPHORUS: CPT

## 2022-04-15 PROCEDURE — 770001 HCHG ROOM/CARE - MED/SURG/GYN PRIV*

## 2022-04-15 PROCEDURE — A9270 NON-COVERED ITEM OR SERVICE: HCPCS | Performed by: HOSPITALIST

## 2022-04-15 PROCEDURE — 85007 BL SMEAR W/DIFF WBC COUNT: CPT

## 2022-04-15 PROCEDURE — 83550 IRON BINDING TEST: CPT

## 2022-04-15 PROCEDURE — 80048 BASIC METABOLIC PNL TOTAL CA: CPT

## 2022-04-15 PROCEDURE — 80053 COMPREHEN METABOLIC PANEL: CPT

## 2022-04-15 PROCEDURE — 700102 HCHG RX REV CODE 250 W/ 637 OVERRIDE(OP): Performed by: STUDENT IN AN ORGANIZED HEALTH CARE EDUCATION/TRAINING PROGRAM

## 2022-04-15 PROCEDURE — 83735 ASSAY OF MAGNESIUM: CPT

## 2022-04-15 PROCEDURE — 85520 HEPARIN ASSAY: CPT

## 2022-04-15 PROCEDURE — A9270 NON-COVERED ITEM OR SERVICE: HCPCS | Performed by: STUDENT IN AN ORGANIZED HEALTH CARE EDUCATION/TRAINING PROGRAM

## 2022-04-15 RX ORDER — FERROUS SULFATE 325(65) MG
325 TABLET ORAL
Status: DISCONTINUED | OUTPATIENT
Start: 2022-04-16 | End: 2022-04-16 | Stop reason: HOSPADM

## 2022-04-15 RX ORDER — ECHINACEA PURPUREA EXTRACT 125 MG
2 TABLET ORAL
Status: DISCONTINUED | OUTPATIENT
Start: 2022-04-15 | End: 2022-04-16 | Stop reason: HOSPADM

## 2022-04-15 RX ORDER — ASCORBIC ACID 500 MG
500 TABLET ORAL DAILY
Status: DISCONTINUED | OUTPATIENT
Start: 2022-04-15 | End: 2022-04-16 | Stop reason: HOSPADM

## 2022-04-15 RX ADMIN — ONDANSETRON 4 MG: 4 TABLET, ORALLY DISINTEGRATING ORAL at 14:32

## 2022-04-15 RX ADMIN — APIXABAN 10 MG: 5 TABLET, FILM COATED ORAL at 17:03

## 2022-04-15 RX ADMIN — INSULIN HUMAN 2 UNITS: 100 INJECTION, SOLUTION PARENTERAL at 17:08

## 2022-04-15 RX ADMIN — INSULIN HUMAN 2 UNITS: 100 INJECTION, SOLUTION PARENTERAL at 20:43

## 2022-04-15 RX ADMIN — INSULIN HUMAN 2 UNITS: 100 INJECTION, SOLUTION PARENTERAL at 11:30

## 2022-04-15 RX ADMIN — APIXABAN 10 MG: 5 TABLET, FILM COATED ORAL at 08:47

## 2022-04-15 RX ADMIN — SENNOSIDES AND DOCUSATE SODIUM 2 TABLET: 50; 8.6 TABLET ORAL at 17:03

## 2022-04-15 RX ADMIN — OXYCODONE HYDROCHLORIDE AND ACETAMINOPHEN 500 MG: 500 TABLET ORAL at 09:29

## 2022-04-15 ASSESSMENT — ENCOUNTER SYMPTOMS
STRIDOR: 0
PALPITATIONS: 0
CHILLS: 0
BACK PAIN: 0
FEVER: 0
NECK PAIN: 0
DIZZINESS: 0
COUGH: 0
NERVOUS/ANXIOUS: 0
NAUSEA: 0
SHORTNESS OF BREATH: 1
HEMOPTYSIS: 0
HEADACHES: 0
BRUISES/BLEEDS EASILY: 0
ABDOMINAL PAIN: 0
SHORTNESS OF BREATH: 0

## 2022-04-15 ASSESSMENT — PAIN DESCRIPTION - PAIN TYPE
TYPE: ACUTE PAIN

## 2022-04-15 ASSESSMENT — PATIENT HEALTH QUESTIONNAIRE - PHQ9
2. FEELING DOWN, DEPRESSED, IRRITABLE, OR HOPELESS: NOT AT ALL
SUM OF ALL RESPONSES TO PHQ9 QUESTIONS 1 AND 2: 0
1. LITTLE INTEREST OR PLEASURE IN DOING THINGS: NOT AT ALL

## 2022-04-15 ASSESSMENT — FIBROSIS 4 INDEX
FIB4 SCORE: 1.15
FIB4 SCORE: 1.11

## 2022-04-15 NOTE — PROGRESS NOTES
Pt had small nose bleed during breakfast. Resolved quickly with less than 10ml of blood. Humidification added to oxygen. CBC resulted. HGB and HCT stable

## 2022-04-15 NOTE — PROGRESS NOTES
Hospital Medicine Daily Progress Note    Date of Service  4/15/2022    Chief Complaint  Short of breath    Hospital Course  Alis Faye is a 51 y.o. female who presented 4/13/2022 with morbid obesity and history of type 2 diabetes presented with dyspnea and generalized malaise and fatigue and noted to have hyperglycemia with mild DKA and saddle pulmonary embolism with right heart strain. On the 4/14 U/S DVT acute occlusive DVT in left proximal femoral to popliteal veins. An echo from 4/14 showed volume overload.  She was admitted to the intensive care unit started on insulin drip and heparin drip.  She underwent EKOS 4/14 and had improved breathing.  There was mention of her going on a long car ride.     Interval Problem Update  4/15/22: Minimal nose bleed with quick resolve this am.  RN placed patient on humidified O2 thereafter.  Per RN also an episode of blood after a bowel movement and patient denied knowledge of hemorrhoids.  She states breathing much better after EKOS.  Some left leg swelling she feels compared to her right leg.  We discussed need for dietary lifestyle and activity change for weight loss.  I did address with her avoidance of contact activities,aspirn and NSAIDs while on Eliquis.      I have personally seen and examined the patient at bedside. I discussed the plan of care with patient and bedside RN.    Consultants/Specialty  Critical Care - signed off    Code Status  DNAR/DNI    Disposition  Patient is not medically cleared for discharge.   Anticipate discharge to to home with close outpatient follow-up.  I have placed the appropriate orders for post-discharge needs.    Review of Systems  Review of Systems   Constitutional: Negative for fever.   HENT: Positive for nosebleeds.    Respiratory: Positive for shortness of breath (slight). Negative for cough, hemoptysis and stridor.    Cardiovascular: Positive for leg swelling. Negative for chest pain and palpitations.   Gastrointestinal:  Negative for abdominal pain and nausea.   Genitourinary: Negative for dysuria.   Musculoskeletal: Negative for back pain and neck pain.   Neurological: Negative for dizziness and headaches.   Psychiatric/Behavioral: The patient is not nervous/anxious.         Physical Exam  Temp:  [36.3 °C (97.3 °F)-36.7 °C (98 °F)] 36.3 °C (97.3 °F)  Pulse:  [] 117  Resp:  [19-37] 22  BP: ()/(58-94) 107/82  SpO2:  [89 %-100 %] 90 %    Physical Exam  Vitals reviewed.   Constitutional:       Appearance: Normal appearance. She is obese. She is not diaphoretic.   HENT:      Head: Normocephalic and atraumatic.      Nose: Nose normal.      Mouth/Throat:      Mouth: Mucous membranes are moist.      Pharynx: No oropharyngeal exudate.   Eyes:      General: No scleral icterus.        Right eye: No discharge.         Left eye: No discharge.      Extraocular Movements: Extraocular movements intact.      Conjunctiva/sclera: Conjunctivae normal.   Cardiovascular:      Rate and Rhythm: Normal rate and regular rhythm.      Pulses:           Radial pulses are 2+ on the right side and 2+ on the left side.        Dorsalis pedis pulses are 2+ on the right side and 2+ on the left side.      Heart sounds: No murmur heard.  Pulmonary:      Effort: Pulmonary effort is normal. No respiratory distress.      Breath sounds: Normal breath sounds. No wheezing or rales.   Abdominal:      General: Bowel sounds are normal. There is no distension.      Palpations: Abdomen is soft.      Tenderness: There is no abdominal tenderness.   Musculoskeletal:         General: No swelling or tenderness.      Cervical back: No tenderness. No muscular tenderness.      Right lower leg: No edema.      Left lower leg: No edema.   Lymphadenopathy:      Cervical: No cervical adenopathy.   Skin:     Coloration: Skin is not jaundiced or pale.   Neurological:      General: No focal deficit present.      Mental Status: She is alert and oriented to person, place, and time.  Mental status is at baseline.      Cranial Nerves: No cranial nerve deficit.   Psychiatric:         Mood and Affect: Mood normal.         Behavior: Behavior normal.         Fluids    Intake/Output Summary (Last 24 hours) at 4/15/2022 0915  Last data filed at 4/15/2022 0800  Gross per 24 hour   Intake 3711.62 ml   Output --   Net 3711.62 ml       Laboratory  Recent Labs     04/14/22  1610 04/14/22  2112 04/15/22  0217   WBC 17.5* 16.5* 14.8*   RBC 4.28 4.35 4.30   HEMOGLOBIN 9.6* 9.7* 9.6*   HEMATOCRIT 32.5* 33.6* 33.2*   MCV 75.9* 77.2* 77.2*   MCH 22.4* 22.3* 22.3*   MCHC 29.5* 28.9* 28.9*   RDW 45.1 45.7 46.9   PLATELETCT 166 180 188   MPV 9.7 10.5 11.3     Recent Labs     04/14/22  1038 04/14/22  1610 04/15/22  0217   SODIUM 139 135 134*   POTASSIUM 3.5* 4.2 4.3   CHLORIDE 109 107 105   CO2 16* 16* 17*   GLUCOSE 106* 112* 131*   BUN 28* 26* 28*   CREATININE 1.06 0.72 0.85   CALCIUM 8.5 8.1* 8.1*     Recent Labs     04/13/22  2330 04/14/22  1240 04/14/22  1628   APTT 29.0  --  37.6*   INR 1.31* 1.38* 1.44*               Imaging  US-EXTREMITY VENOUS LOWER BILAT   Final Result      EC-ECHOCARDIOGRAM COMPLETE W/O CONT   Final Result      IR-PULMONARY ANGIOGRAM-BILATERAL   Final Result      1.  BILATERAL selective pulmonary arteriograms showing segmental branch filling defects representing pulmonary emboli concordant with CT findings   2.  Placement of BILATERAL EKOS catheters   3.  Initiation of bilateral thrombolytic infusion with EKOS acoustic pulse thrombolysis therapy (US-assisted thrombolysis)      CT-CTA CHEST PULMONARY ARTERY W/ RECONS   Final Result      1.  Saddle pulmonary embolus with associated right heart strain.   2.  Scattered bilateral pulmonary infarcts. Small right pleural effusion.      Findings were communicated to CHRIS CAMILO via [email or web address] system on 4/13/2022 10:42 PM.      DX-CHEST-PORTABLE (1 VIEW)   Final Result      No acute cardiac or pulmonary abnormalities are identified.            Assessment/Plan  * Saddle embolus of pulmonary artery (HCC)  Assessment & Plan  Unprovoked saddle PE  Status post EKOS 4/14  Was on heparin drip post EKOS and started Eliquis 4/15 am.  Left leg DVT  Needs outpatient follow up and discussed with patient.  Need to wean oxygen as able and if unable will need home O2.  Mobilize and assess home O2 needs  Discussed 4/15 no aspirin and no NSAID's (ibuprofen, advil, motrin, etc) to the patient while on Eliquis and avoid contact sports/activities.  Watch for bleeding.  Hgb:9.6 (still has her menses).    Morbid obesity with body mass index of 50 or higher ([facility])  Assessment & Plan  Body mass index is 56.03 kg/m².  Discussed 4/15 the need for change of lifestyle for weight loss  She needs close outpatient follow up with a primary and consideration for obesity/bariatric center  I am concerned her adiposity has led to excess estrogen and may have contributed to her DVT/PE    Anemia  Assessment & Plan  Low MCV & MCH  Monitor CBC  Check Iron, ferritin and TIBC  Iron supplement and ascorbic acid.  Needs outpatient colonoscopy.    Respiratory failure with hypoxia ([facility])  Assessment & Plan  Acute respiratory failure with hypoxia secondary to PE  Wean off oxygen as tolerated  Anticoagulation post EKOS  Monitor O2 needs with ambulation.    High anion gap metabolic acidosis  Assessment & Plan  Improved    DKA, type 1, not at goal ([facility])- (present on admission)  Assessment & Plan  DKA resolved  Part of her metabolic acidosis was likely related to hypoxia and PE  NPH 10 units SQ BID  Hold home metformin  4/15 A1c:11.8  Encouraged lifestyle modification 4/15.       VTE prophylaxis: therapeutic anticoagulation with Eliqus    I have performed a physical exam and reviewed and updated ROS and Plan today (4/15/2022). In review of yesterday's note (4/14/2022), there are no changes except as documented above.

## 2022-04-15 NOTE — PROGRESS NOTES
Pt had blood BM, per MD draw CBC early. Sent at 0200 to lab by RN. 0353 no results posted for CBC, this RN called lab and was assured it would run. At 0544 no results posted still for CBC, this RN called lab again and Ondina assured pt it would run. Still awaiting results.

## 2022-04-15 NOTE — PROGRESS NOTES
"Radiology Progress Note   Author: BOONE Villaseñor Date & Time created: 4/15/2022  3:37 PM   Date of admission  4/13/2022  Note to reader: this note follows the APSO format rather than the historical SOAP format. Assessment and plan located at the top of the note for ease of use.    Chief Complaint  51 y.o. female admitted 4/13/2022 with   Chief Complaint   Patient presents with   • Weakness   • Vomiting   • Hyperglycemia   • Hypotension       HPI  \" 51 y.o. female who presented 4/13/2022 with morbid obesity and history of type 2 diabetes presented with dyspnea and generalized malaise and fatigue and noted to have hyperglycemia with mild DKA and saddle pulmonary embolism with right heart strain. On the 4/14 U/S DVT acute occlusive DVT in left proximal femoral to popliteal veins. An echo from 4/14 showed volume overload.  She was admitted to the intensive care unit started on insulin drip and heparin drip.  She underwent EKOS 4/14 and had improved breathing.  There was mention of her going on a long car ride\"      Assessment/Plan  Interval History   Principal Problem:    Saddle embolus of pulmonary artery (HCC)  Active Problems:    DKA, type 1, not at goal (HCC)    High anion gap metabolic acidosis    Sinus tachycardia    Respiratory failure with hypoxia (HCC)    Anemia    Morbid obesity with body mass index of 50 or higher (HCC)    Leg DVT (deep venous thromboembolism), acute, left (HCC)    Pulmonary embolism, bilateral (HCC)      Plan IR  - Primary team managing Eliquis medication  - No other interventions from IR   -Thank you for allowing Interventional Radiology team to participate in the patients care, if any additional care or requests are needed in the future please do not hesitate call or place IR order. IR signing off   [phone]      IR:   4/14- Bilateral EKOS Cath Placed, removed 4 hrs later  4/15- O2 Weaned down to 95% RA. Right groin puncture site CDI, no redness or swelling. Patient states that " she is not currently SOB, denies chest pain.             Review of Systems  Physical Exam   Review of Systems   Constitutional: Positive for malaise/fatigue. Negative for chills and fever.   HENT: Negative for hearing loss.    Respiratory: Negative for cough, hemoptysis and shortness of breath.    Cardiovascular: Positive for leg swelling. Negative for chest pain and palpitations.   Gastrointestinal: Negative for nausea.   Neurological: Negative for dizziness.   Endo/Heme/Allergies: Does not bruise/bleed easily.      Vitals:    04/15/22 1400   BP: 114/76   Pulse: (!) 107   Resp: (!) 23   Temp:    SpO2: 94%        Physical Exam  Constitutional:       Appearance: She is morbidly obese.   HENT:      Head: Normocephalic.      Nose: Nose normal.      Mouth/Throat:      Mouth: Mucous membranes are moist.   Eyes:      Pupils: Pupils are equal, round, and reactive to light.   Cardiovascular:      Rate and Rhythm: Tachycardia present.   Pulmonary:      Effort: Pulmonary effort is normal. No respiratory distress.   Abdominal:      Palpations: Abdomen is soft.   Musculoskeletal:         General: Swelling present. No tenderness or deformity.      Cervical back: Normal range of motion.   Skin:     General: Skin is warm and dry.      Coloration: Skin is not jaundiced or pale.   Neurological:      Mental Status: She is alert and oriented to person, place, and time.   Psychiatric:         Mood and Affect: Mood normal.         Behavior: Behavior normal.             Labs    Recent Labs     04/14/22  1610 04/14/22  2112 04/15/22  0217   WBC 17.5* 16.5* 14.8*   RBC 4.28 4.35 4.30   HEMOGLOBIN 9.6* 9.7* 9.6*   HEMATOCRIT 32.5* 33.6* 33.2*   MCV 75.9* 77.2* 77.2*   MCH 22.4* 22.3* 22.3*   MCHC 29.5* 28.9* 28.9*   RDW 45.1 45.7 46.9   PLATELETCT 166 180 188   MPV 9.7 10.5 11.3     Recent Labs     04/14/22  1610 04/15/22  0217 04/15/22  0933   SODIUM 135 134* 137   POTASSIUM 4.2 4.3 4.9   CHLORIDE 107 105 106   CO2 16* 17* 17*   GLUCOSE  112* 131* 145*   BUN 26* 28* 29*   CREATININE 0.72 0.85 0.83   CALCIUM 8.1* 8.1* 8.3*     Recent Labs     04/13/22  1839 04/14/22  0054 04/14/22  0535 04/14/22  1610 04/15/22  0217 04/15/22  0933   ALBUMIN 3.6 3.4  --   --  3.3  --    TBILIRUBIN 0.5 0.5  --   --  0.5  --    ALKPHOSPHAT 105* 101*  --   --  101*  --    TOTPROTEIN 7.3 7.3  --   --  6.8  --    ALTSGPT 21 21  --   --  28  --    ASTSGOT 13 21  --   --  22  --    CREATININE 0.91 1.01   < > 0.72 0.85 0.83    < > = values in this interval not displayed.     US-EXTREMITY VENOUS LOWER BILAT   Final Result      EC-ECHOCARDIOGRAM COMPLETE W/O CONT   Final Result      IR-PULMONARY ANGIOGRAM-BILATERAL   Final Result      1.  BILATERAL selective pulmonary arteriograms showing segmental branch filling defects representing pulmonary emboli concordant with CT findings   2.  Placement of BILATERAL EKOS catheters   3.  Initiation of bilateral thrombolytic infusion with EKOS acoustic pulse thrombolysis therapy (US-assisted thrombolysis)      CT-CTA CHEST PULMONARY ARTERY W/ RECONS   Final Result      1.  Saddle pulmonary embolus with associated right heart strain.   2.  Scattered bilateral pulmonary infarcts. Small right pleural effusion.      Findings were communicated to CHRIS CAMILO via [employer] messaging system on 4/13/2022 10:42 PM.      DX-CHEST-PORTABLE (1 VIEW)   Final Result      No acute cardiac or pulmonary abnormalities are identified.          INR   Date Value Ref Range Status   04/14/2022 1.44 (H) 0.87 - 1.13 Final     Comment:     INR - Non-therapeutic Reference Range: 0.87-1.13  INR - Therapeutic Reference Range: 2.0-4.0       No results found for: POCINR     Intake/Output Summary (Last 24 hours) at 4/15/2022 1537  Last data filed at 4/15/2022 1300  Gross per 24 hour   Intake 2407.09 ml   Output 50 ml   Net 2357.09 ml      Labs not explicitly included in this progress note were reviewed by the author. Radiology/imaging not explicitly included in this  progress note was reviewed by the author.     I have performed a physical exam and reviewed and updated ROS and Plan today (4/15/2022).     10 minutes in directly providing and coordinating care and extensive data review.  No time overlap and excludes procedures.

## 2022-04-15 NOTE — PROGRESS NOTES
4 Eyes Skin Assessment Completed by Kade RN and DAVID Chinchilla.    Head WDL  Ears WDL  Nose WDL  Mouth WDL  Neck WDL  Breast/Chest WDL  Shoulder Blades WDL  Spine WDL  (R) Arm/Elbow/Hand WDL edema  (L) Arm/Elbow/Hand Edema  Abdomen WDL moisture   Groin Incision Sheath site R side  Scrotum/Coccyx/Buttocks WDL  (R) Leg WDL  (L) Leg WDL  (R) Heel/Foot/Toe Edema  (L) Heel/Foot/Toe Edema          Devices In Places ECG, Blood Pressure Cuff, Pulse Ox, SCD's and Nasal Cannula      Interventions In Place Q2 Turns and Low Air Loss Mattress    Possible Skin Injury No    Pictures Uploaded Into Epic N/A  Wound Consult Placed N/A  RN Wound Prevention Protocol Ordered Yes

## 2022-04-15 NOTE — CARE PLAN
The patient is Watcher - Medium risk of patient condition declining or worsening    Shift Goals  Clinical Goals: neurochecks, XA  Patient Goals: Rest  Family Goals: NA    Progress made toward(s) clinical / shift goals:    Problem: Knowledge Deficit - Standard  Goal: Patient and family/care givers will demonstrate understanding of plan of care, disease process/condition, diagnostic tests and medications  Outcome: Progressing Patient is educated of disease process and condition. Treatment team has included patient in plan of care. All medications indications and side effects are explained. Patient is encouraged to ask questions. Patient indicates understanding.     Problem: Fall Risk  Goal: Patient will remain free from falls  Outcome: Progressing - Pt is A&Ox4. Bed alarm in place. Treaded slipper socks on pt. Educated to call light. Personal belongings within reach. Remains free from falls at this time.       Problem: Respiratory  Goal: Patient will achieve/maintain optimum respiratory ventilation and gas exchange  Outcome: Progressing - decreased O2 to 3L NC from 5L NC       Patient is not progressing towards the following goals:

## 2022-04-15 NOTE — ASSESSMENT & PLAN NOTE
Body mass index is 56.03 kg/m².  Discussed 4/15 the need for change of lifestyle for weight loss  She needs close outpatient follow up with a primary and consideration for obesity/bariatric center  I am concerned her adiposity has led to excess estrogen and may have contributed to her DVT/PE

## 2022-04-15 NOTE — PROGRESS NOTES
Pulmonary and Critical Care Medicine Progress Note      This charming lady had pseudohyponatremia on 4/14/2022 at 0054 hours due to hypoglycemia.  This is clinically insignificant.      Armani Bradley MD  Pulmonary and Critical Care Medicine

## 2022-04-15 NOTE — ASSESSMENT & PLAN NOTE
4/14/22 U/S LE:   Evidence of acute occlusive deep venous thrombosis seen from the LEFT    proximal femoral to popliteal veins.   The LEFT peroneal and posterior tibial veins are difficult to assess for    compressibility, and flow response to augmentation is not demonstrated due    to ECMO color artifact.

## 2022-04-15 NOTE — PROGRESS NOTES
0622 results for CBC still not posted, talked to Arthur from lab and he said he would run the blood sample.

## 2022-04-15 NOTE — CARE PLAN
The patient is Stable - Low risk of patient condition declining or worsening    Shift Goals  Clinical Goals: Monitor for bleeding, Xa  Patient Goals: Rest  Family Goals: NA    Progress made toward(s) clinical / shift goals:    Problem: Respiratory  Goal: Patient will achieve/maintain optimum respiratory ventilation and gas exchange  Outcome: Progressing  Note: Was able to titrate off o2 while pt awake. Had to place pt back on 1L while sleeping for saturation 82%     Problem: Mobility  Goal: Patient's capacity to carry out activities will improve  Outcome: Progressing  Note: Pt up in chair for every meal. Still does not want to go for a walk. Encouragement given for progressing mobility necessity        Patient is not progressing towards the following goals:

## 2022-04-15 NOTE — PROGRESS NOTES
Patient transported to floor from S121 w/ ACLS RN. Patient A&Ox4. Plan of care discussed with patient.  Patient oriented to floor and surroundings. Patient currently on 5L NC. VSS. Artline zeroed and power flushed. Patient is not expressing any complaints of pain at this time. Tele box placed. Monitor room notified. 2 rn skin check performed. Patient educated to call for assistance before ambulating. Patient education regarding fall risk. Call light within reach. Fall precautions in place.

## 2022-04-15 NOTE — PROGRESS NOTES
Pt L nare started bleeding again. Pt holding pressure and Ice being applied. Swift spry ordered to help moisturize area after bleeding stops. Dr. Villagomez Updated

## 2022-04-15 NOTE — DIETARY
"Nutrition Services: F/u    Admit day 2.  Pt now on Consistent CHO (Diabetic) diet, PO % thus far. HA1c resulted @ 11.8%.  Visited pt to check interest in discussing DM diet. Pt was on a phone call but was open to a brief conversation with RD. Pt said she had not looked at the DM booklet yet (provided by RD yesterday). I told pt that her HA1c was almost 12% and that she should be invested in making dietary changes. Pt responded, \"So that means no more Coke?\" Advised importance of low-sugar intake and provided additional dietary handouts. Asked if pt would like to review the materials and have RD comeback in couple of days, and she was agreeable.   RD will check back as able prior to pt's D/c.   "

## 2022-04-15 NOTE — ASSESSMENT & PLAN NOTE
Low MCV & MCH  Monitor CBC  Check Iron, ferritin and TIBC  Iron supplement and ascorbic acid.  Needs outpatient colonoscopy.

## 2022-04-16 ENCOUNTER — PHARMACY VISIT (OUTPATIENT)
Dept: PHARMACY | Facility: MEDICAL CENTER | Age: 52
End: 2022-04-16
Payer: COMMERCIAL

## 2022-04-16 VITALS
DIASTOLIC BLOOD PRESSURE: 86 MMHG | RESPIRATION RATE: 18 BRPM | WEIGHT: 269.4 LBS | OXYGEN SATURATION: 94 % | SYSTOLIC BLOOD PRESSURE: 109 MMHG | HEART RATE: 96 BPM | HEIGHT: 58 IN | BODY MASS INDEX: 56.55 KG/M2 | TEMPERATURE: 97.2 F

## 2022-04-16 PROBLEM — R00.0 SINUS TACHYCARDIA: Status: RESOLVED | Noted: 2022-04-13 | Resolved: 2022-04-16

## 2022-04-16 PROBLEM — E87.29 HIGH ANION GAP METABOLIC ACIDOSIS: Status: RESOLVED | Noted: 2022-04-13 | Resolved: 2022-04-16

## 2022-04-16 PROBLEM — E11.65 TYPE 2 DIABETES MELLITUS WITH HYPERGLYCEMIA, WITHOUT LONG-TERM CURRENT USE OF INSULIN (HCC): Status: ACTIVE | Noted: 2022-04-13

## 2022-04-16 LAB
ANION GAP SERPL CALC-SCNC: 11 MMOL/L (ref 7–16)
BASOPHILS # BLD AUTO: 0.3 % (ref 0–1.8)
BASOPHILS # BLD: 0.03 K/UL (ref 0–0.12)
BUN SERPL-MCNC: 27 MG/DL (ref 8–22)
CALCIUM SERPL-MCNC: 8.3 MG/DL (ref 8.5–10.5)
CHLORIDE SERPL-SCNC: 102 MMOL/L (ref 96–112)
CO2 SERPL-SCNC: 17 MMOL/L (ref 20–33)
CREAT SERPL-MCNC: 0.66 MG/DL (ref 0.5–1.4)
EOSINOPHIL # BLD AUTO: 0.14 K/UL (ref 0–0.51)
EOSINOPHIL NFR BLD: 1.3 % (ref 0–6.9)
ERYTHROCYTE [DISTWIDTH] IN BLOOD BY AUTOMATED COUNT: 44.7 FL (ref 35.9–50)
GFR SERPLBLD CREATININE-BSD FMLA CKD-EPI: 106 ML/MIN/1.73 M 2
GLUCOSE BLD STRIP.AUTO-MCNC: 154 MG/DL (ref 65–99)
GLUCOSE BLD STRIP.AUTO-MCNC: 181 MG/DL (ref 65–99)
GLUCOSE SERPL-MCNC: 166 MG/DL (ref 65–99)
HCT VFR BLD AUTO: 30.7 % (ref 37–47)
HGB BLD-MCNC: 9 G/DL (ref 12–16)
IMM GRANULOCYTES # BLD AUTO: 0.07 K/UL (ref 0–0.11)
IMM GRANULOCYTES NFR BLD AUTO: 0.7 % (ref 0–0.9)
LYMPHOCYTES # BLD AUTO: 2.28 K/UL (ref 1–4.8)
LYMPHOCYTES NFR BLD: 21.5 % (ref 22–41)
MCH RBC QN AUTO: 22.1 PG (ref 27–33)
MCHC RBC AUTO-ENTMCNC: 29.3 G/DL (ref 33.6–35)
MCV RBC AUTO: 75.4 FL (ref 81.4–97.8)
MONOCYTES # BLD AUTO: 1.04 K/UL (ref 0–0.85)
MONOCYTES NFR BLD AUTO: 9.8 % (ref 0–13.4)
NEUTROPHILS # BLD AUTO: 7.06 K/UL (ref 2–7.15)
NEUTROPHILS NFR BLD: 66.4 % (ref 44–72)
NRBC # BLD AUTO: 0.23 K/UL
NRBC BLD-RTO: 2.2 /100 WBC
PLATELET # BLD AUTO: 183 K/UL (ref 164–446)
PMV BLD AUTO: 10.5 FL (ref 9–12.9)
POTASSIUM SERPL-SCNC: 4.6 MMOL/L (ref 3.6–5.5)
RBC # BLD AUTO: 4.07 M/UL (ref 4.2–5.4)
SODIUM SERPL-SCNC: 130 MMOL/L (ref 135–145)
WBC # BLD AUTO: 10.6 K/UL (ref 4.8–10.8)

## 2022-04-16 PROCEDURE — 99239 HOSP IP/OBS DSCHRG MGMT >30: CPT | Performed by: HOSPITALIST

## 2022-04-16 PROCEDURE — 80048 BASIC METABOLIC PNL TOTAL CA: CPT

## 2022-04-16 PROCEDURE — 700102 HCHG RX REV CODE 250 W/ 637 OVERRIDE(OP): Performed by: HOSPITALIST

## 2022-04-16 PROCEDURE — 85025 COMPLETE CBC W/AUTO DIFF WBC: CPT

## 2022-04-16 PROCEDURE — RXMED WILLOW AMBULATORY MEDICATION CHARGE: Performed by: HOSPITALIST

## 2022-04-16 PROCEDURE — 82962 GLUCOSE BLOOD TEST: CPT

## 2022-04-16 PROCEDURE — A9270 NON-COVERED ITEM OR SERVICE: HCPCS | Performed by: HOSPITALIST

## 2022-04-16 RX ORDER — LANCETS 30 GAUGE
EACH MISCELLANEOUS
Qty: 100 EACH | Refills: 1 | Status: SHIPPED | OUTPATIENT
Start: 2022-04-16

## 2022-04-16 RX ORDER — GLUCOSAMINE HCL/CHONDROITIN SU 500-400 MG
CAPSULE ORAL
Qty: 100 EACH | Refills: 1 | Status: SHIPPED | OUTPATIENT
Start: 2022-04-16

## 2022-04-16 RX ORDER — INSULIN GLARGINE 100 [IU]/ML
15 INJECTION, SOLUTION SUBCUTANEOUS EVERY EVENING
Qty: 9 ML | Refills: 1 | Status: SHIPPED | OUTPATIENT
Start: 2022-04-16

## 2022-04-16 RX ORDER — DIPHENHYDRAMINE HYDROCHLORIDE 25 MG/1
CAPSULE, LIQUID FILLED ORAL
Qty: 1 KIT | Refills: 0 | Status: SHIPPED | OUTPATIENT
Start: 2022-04-16

## 2022-04-16 RX ORDER — LANOLIN ALCOHOL/MO/W.PET/CERES
325 CREAM (GRAM) TOPICAL
Qty: 30 TABLET | Refills: 1 | Status: SHIPPED | OUTPATIENT
Start: 2022-04-17

## 2022-04-16 RX ORDER — ACETAMINOPHEN 325 MG/1
650 TABLET ORAL EVERY 6 HOURS PRN
Qty: 30 TABLET | Refills: 0 | COMMUNITY
Start: 2022-04-16 | End: 2022-05-16

## 2022-04-16 RX ADMIN — OXYCODONE HYDROCHLORIDE AND ACETAMINOPHEN 500 MG: 500 TABLET ORAL at 05:38

## 2022-04-16 RX ADMIN — FERROUS SULFATE TAB 325 MG (65 MG ELEMENTAL FE) 325 MG: 325 (65 FE) TAB at 07:48

## 2022-04-16 RX ADMIN — INSULIN HUMAN 2 UNITS: 100 INJECTION, SOLUTION PARENTERAL at 12:30

## 2022-04-16 RX ADMIN — APIXABAN 10 MG: 5 TABLET, FILM COATED ORAL at 05:38

## 2022-04-16 RX ADMIN — INSULIN HUMAN 2 UNITS: 100 INJECTION, SOLUTION PARENTERAL at 05:39

## 2022-04-16 ASSESSMENT — PAIN DESCRIPTION - PAIN TYPE
TYPE: ACUTE PAIN
TYPE: ACUTE PAIN

## 2022-04-16 NOTE — CONSULTS
Diabetes education: Met with pt this afternoon. Pt has a hx of diabetes, previously on metformin, stopped secondary to no insurance. Pt has done finger sticks before and given family insulin via insulin pens.  Pt was admitted with blood sugar of 541 and Hga1c of 11.8%.  Pt is currently on NPH 10 units BID with regular insulin per sliding scale coverage ac and hs. Blood sugars are 115, 153  ( 2 units), and 188  ( 2 units).  Pt was instructed/reviewed on insulin pens and a One touch verio reflect meter given and reviewed. Pt was given handouts on both pens and vials but would prefer insulin pens. Insulin reviewed briefly but would benefit in review again by nursing and or pharmacist closer to discharge when feeling better.  Plan: Pt will need prescriptions for One touch verio meter ( meter given but needs order for coverage), strips and lancets. If wanted to keep on NPH please order insulin pens at discharge and remind pt need for 3 meals and hs snack as NPH peaks at lunch and HS. If possible change to Basaglar kwik pens, and ceci pen needles at dc ( covered by her insurance). Unfortunately her insurance only covers vials ( Admelog) for fast acting (without a PA) so pt would need scale written out and for ac only if needed on dc Please order for 30 days ( in arleth) and 1 refill. Please use diabetes supplies, F2 for correct meter, supplies and dx code and please send to [facility].  If to go home on vials pt will need to be taught to draw up and inject with nursing. Please have pt stick her finger and give her insulin with nursing.

## 2022-04-16 NOTE — DISCHARGE INSTRUCTIONS
Discharge Instructions    Discharged to home by car with relative. Discharged via walking, hospital escort: Refused.  Special equipment needed: Oxygen    Be sure to schedule a follow-up appointment with your primary care doctor or any specialists as instructed.     Discharge Plan:   Diet Plan: Discussed (diabetic diet)  Activity Level: Discussed (activity as tolerated)  Confirmed Follow up Appointment: Appointment Scheduled  Confirmed Symptoms Management: Discussed  Medication Reconciliation Updated: Yes    I understand that a diet low in cholesterol, fat, and sodium is recommended for good health. Unless I have been given specific instructions below for another diet, I accept this instruction as my diet prescription.   Other diet: diabetic      Special Instructions:   Deep Vein Thrombosis Discharge Instructions    A deep vein thrombosis (DVT) is a blood clot (thrombus) that develops in a deep vein. A DVT is a clot in the deep, larger veins of the leg, arm, or pelvis. These are more dangerous than clots that might form in veins on the surface of the body. Deep vein thrombosis can lead to complications if the clot breaks off and travels in the bloodstream to the lungs.     CAUSES  Blood clots form in a vein for different reasons. Usually several things cause blood clots. They include:   · The flow of blood slows down.   · The inside of the vein is damaged in some way.   · The person has a condition that makes blood clot more easily. These conditions may include:  · Older age (especially over 75 years old).  · Having a history of blood clots.  · Having major or lengthy surgery. Hip surgery is particularly high-risk.   · Breaking a hip or leg.  · Sitting or lying still for a long time.  · Cancer or cancer treatment.  · Having a long, thin tube (catheter) placed inside a vein during a medical procedure.   · Being overweight (obese).  · Pregnancy and childbirth.  · Medicines with estrogen.  · Smoking.  · Other  circulation or heart problems.     SYMPTOMS  When a clot forms, it can either partially or totally block the blood flow in that vein. Symptoms of a DVT can include:  · Swelling of the leg or arm, especially if one side is much worse.  · Warmth and redness of the leg or arm, especially if one side is much worse.   · Pain in an arm or leg. If the clot is in the leg, symptoms may be more noticeable or worse when standing or walking.  If the blood clot travels to the lung, it may cause:  · Shortness of breath.  · Chest pain. The pain may be worsened by deep breaths.   · Coughing up thick mucus (phlegm), possibly flecked with blood.   Anyone with these symptoms should get emergency medical treatment right away. Call your local emergency  Services (911 in [place] if you have these symptoms.     DIAGNOSIS  If a DVT is suspected, your caregiver will take a full medical history. He or she will also perform a physical exam. Tests that also may be required include:   · Studies of the clotting properties of the blood.   · An ultrasound scan.   · X-rays to show the flow of blood when special dye is injected into the veins (venography).   · Studies of your lungs if you have any chest symptoms.     PREVENTION  · Exercise the legs regularly. Take a brisk 30 minute walk every day.   · Maintain a weight that is appropriate for your height.  · Avoid sitting or lying in bed for long periods of time without moving your legs.   · Women, particularly those over the age of 35, should consider the risks and benefits of taking estrogen medicine, including birth control pills.   · Do not smoke, especially if you take estrogen medicines.   · Long-distance travel can increase your risk. You should exercise your legs by walking or pumping the muscles every hour.   · In hospital prevention: Prevention may include medical and non medical measures.     TREATMENT  · The most common treatment for DVT is blood thinning (anticoagulant) medicine, which  reduces the blood's tendency to clot. Anticoagulants can stop new blood clots from forming and old ones from growing. They cannot dissolve existing clots. Your body does this by itself over time. Anticoagulants can be given by mouth, by intravenous (IV) access, or by injection. Your caregiver will determine the best program for you.   · Less commonly, clot-dissolving drugs (thrombolytics) are used to dissolve a DVT. They carry a high risk of bleeding, so they are used mainly in severe cases.   · Very rarely, a blood clot in the leg needs to be removed surgically.   · If you are unable to take anticoagulants, your caregiver may arrange for you to have a filter placed in a main vein in your belly (abdomen). This filter prevents clots from traveling to your lungs.     HOME CARE INSTRUCTIONS  Take all medicines prescribed by your caregiver. Follow the directions carefully.   · You will most likely continue taking anticoagulants after you leave the hospital. Your caregiver will advise you on the length of treatment (usually 3 to 5 months, sometimes for life).   · Taking too much or too little of an anticoagulant is dangerous. While taking this type of medicine, you will need to have regular blood tests to be sure the dose is correct. The dose can change for many reasons. It is critically important that you take this medicine exactly as prescribed, and that you have blood tests exactly as directed.   · Many foods can interfere with anticoagulants. These include foods high in vitamin K, such as spinach, kale, broccoli, cabbage, kelly and turnip greens, [place] sprouts, peas, cauliflower, seaweed, parsley, beef and pork liver, green tea, and soybean oil. Your caregiver should discuss limits on these foods with you or you should arrange a visit with a dietician to answer your questions.   · Many medicines can interfere with anticoagulants. You must tell your caregiver about any and all medicines you take. This includes  all vitamins and supplements. Be especially cautious with aspirin and anti-inflammatory medicines. Ask your caregiver before taking these.   · Anticoagulants can have side effects, mostly excessive bruising or bleeding. You will need to hold pressure over cuts for longer than usual. Avoid alcoholic drinks or consume only very small amounts while taking this medicine.    If you are taking an anticoagulant:  · Wear a medical alert bracelet.  · Notify your dentist or other caregivers before procedures.  · Avoid contact sports.    · Ask your caregiver how soon you can go back to normal activities. Not being active can lead to new clots. Ask for a list of what you should and should not do.   · Exercise your lower leg muscles. This is important while traveling.   · You may need to wear compression stockings. These are tight elastic stockings that apply pressure to the lower legs. This can help keep the blood in the legs from clotting.   · If you are a smoker, you should quit.   · Learn as much as you can about DVT.     SEEK MEDICAL CARE IF:  · You have unusual bruising or any bleeding problems.  · The swelling or pain in your affected arm or leg is not gradually improving.   · You anticipate surgery or long-distance travel. You should get specific advice on DVT prevention.   · You discover other family members with blood clots. This may require further testing for inherited diseases or conditions.     SEEK IMMEDIATE MEDICAL CARE IF:  · You develop chest pain.  · You develop severe shortness of breath.  · You begin to cough up bloody mucus or phlegm (sputum).  · You feel dizzy or faint.   · You develop swelling or pain in the leg.  · You have breathing problems after traveling.    MAKE SURE YOU:  · Understand these instructions.  · Will watch your condition.  · Will get help right away if you are not doing well or get worse.       · Is patient discharged on Warfarin / Coumadin?   No     Depression / Suicide Risk    As you  are discharged from this [facility] facility, it is important to learn how to keep safe from harming yourself.    Recognize the warning signs:  · Abrupt changes in personality, positive or negative- including increase in energy   · Giving away possessions  · Change in eating patterns- significant weight changes-  positive or negative  · Change in sleeping patterns- unable to sleep or sleeping all the time   · Unwillingness or inability to communicate  · Depression  · Unusual sadness, discouragement and loneliness  · Talk of wanting to die  · Neglect of personal appearance   · Rebelliousness- reckless behavior  · Withdrawal from people/activities they love  · Confusion- inability to concentrate     If you or a loved one observes any of these behaviors or has concerns about self-harm, here's what you can do:  · Talk about it- your feelings and reasons for harming yourself  · Remove any means that you might use to hurt yourself (examples: pills, rope, extension cords, firearm)  · Get professional help from the community (Mental Health, Substance Abuse, psychological counseling)  · Do not be alone:Call your Safe Contact- someone whom you trust who will be there for you.  · Call your local CRISIS HOTLINE 072-7626 or 298-223-2170  · Call your local Children's Mobile Crisis Response Team [place] (733) 710-9990 or www.[employer]  · Call the toll free National Suicide Prevention Hotlines   · National Suicide Prevention Lifeline 383-306-SSBZ (7257)  · National Hope Line Network 800-SUICIDE (341-6505)

## 2022-04-16 NOTE — DISCHARGE SUMMARY
Discharge Summary    CHIEF COMPLAINT ON ADMISSION  Chief Complaint   Patient presents with   • Weakness   • Vomiting   • Hyperglycemia   • Hypotension       Reason for Admission  EMS     Admission Date  4/13/2022    CODE STATUS  DNAR/DNI    HPI & HOSPITAL COURSE  This is a 51 y.o. female here with morbid obesity 2 diabetes presented with dyspnea and generalized malaise noted to have hyperglycemia with mild DKA and saddle pulmonary embolism with right heart strain as well as left proximal femoral occlusive DVT.  She was initially admitted to the intensive care unit on insulin drip and heparin drip.  She underwent EKOS on 4/14/2022 with improvement in her oxygen requirements.  Her DKA resolved and she was transitioned to subcutaneous insulin and she received diabetic teaching.  She was transitioned to Eliquis.  On examination this morning she is tolerating her diet.  She is on 1 L nasal cannula and otherwise clinically stable.  We will arrange for home oxygen Case management has assisted the patient with establishing primary care at [facility].  Reviewed with patient importance to comply with medical therapy and monitoring her CBGs and keeping a CBG log to take to her follow-up appointment.  The patient received diabetic teaching        Therefore, she is discharged in good and stable condition to home with close outpatient follow-up.    The patient met 2-midnight criteria for an inpatient stay at the time of discharge.    Discharge Date  4/16/2022    FOLLOW UP ITEMS POST DISCHARGE  Continue Eliquis and follow-up with PCP to consider evaluation for thrombophilia and preventive screening for breast and colon cancer  Keep written log of CBG and follow-up with PCP  Recheck CBC and consider further work-up for microcytic anemia as outpatient    DISCHARGE DIAGNOSES  Principal Problem:    Saddle embolus of pulmonary artery (HCC) POA: Unknown  Active Problems:    Type 2 diabetes mellitus with hyperglycemia, without long-term current  use of insulin (HCC) POA: Yes    Respiratory failure with hypoxia (HCC) POA: Unknown    Anemia POA: Unknown    Morbid obesity with body mass index of 50 or higher ([facility]) POA: Unknown    Leg DVT (deep venous thromboembolism), acute, left (HCC) POA: Unknown    Pulmonary embolism, bilateral (HCC) POA: Yes  Resolved Problems:    High anion gap metabolic acidosis POA: Unknown    Sinus tachycardia POA: Unknown      FOLLOW UP  Future Appointments   Date Time Provider Department Center   5/16/2022  1:00 PM SOLITARIO CareyM T[facility]     No follow-up provider specified.    MEDICATIONS ON DISCHARGE     Medication List      START taking these medications      Instructions   acetaminophen 325 MG Tabs  Commonly known as: Tylenol   Take 2 Tablets by mouth every 6 hours as needed.  Dose: 650 mg     Alcohol Swabs   Doctor's comments: Per formulary preference. ICD-10 code: E11.65 Uncontrolled type 2 Diabetes Mellitus  Wipe site with prep pad prior to injection.     * apixaban 5mg Tabs  Commonly known as: ELIQUIS   Take 2 Tablets by mouth 2 times a day for 6 days. Indications: DVT/PE  Dose: 10 mg     * apixaban 5mg Tabs  Start taking on: April 22, 2022  Commonly known as: ELIQUIS   Take 1 Tablet by mouth 2 times a day. Indications: DVT/PE  Dose: 5 mg     Blood Glucose Monitor System w/Device Kit   Doctor's comments: Or per formulary preference. ICD-10 code: E11.65 Uncontrolled type 2 Diabetes Mellitus  Test blood sugar as recommended by provider.     ferrous sulfate 325 (65 Fe) MG EC tablet  Start taking on: April 17, 2022   Take 1 Tablet by mouth every morning with breakfast.  Dose: 325 mg     glucose blood strip   Doctor's comments: Or per formulary preference. ICD-10 code: E11.65 Uncontrolled type 2 Diabetes Mellitus  Test blood sugar three times daily.     insulin glargine 100 UNIT/ML Sopn injection  Generic drug: insulin glargine   Inject 15 Units under the skin every evening.  Dose: 15 Units     Insulin Pen  Needle 32 G x 4 mm   Doctor's comments: Per patient/formulary preference. ICD-10 code: E11.65 Uncontrolled type 2 Diabetes Mellitus  Use one pen needle in pen device to inject insulin once daily.     Lancets   Doctor's comments: Or per formulary preference. ICD-10 code: E11.65 Uncontrolled type 2 Diabetes Mellitus  Use one One Touch Verio lancet to test blood sugar three times daily.         * This list has 2 medication(s) that are the same as other medications prescribed for you. Read the directions carefully, and ask your doctor or other care provider to review them with you.            CONTINUE taking these medications      Instructions   fluconazole 150 MG tablet  Commonly known as: Diflucan   Doctor's comments: Take on Friday, may repeat in 72 hours if symptoms persist.  Take 1 Tablet by mouth every day.  Dose: 150 mg     metformin 1000 MG tablet  Commonly known as: GLUCOPHAGE   Take 1 Tablet by mouth 2 times a day with meals.  Dose: 1,000 mg     ondansetron 4 MG Tbdp  Commonly known as: Zofran ODT   Take 1 Tablet by mouth every 6 hours as needed for Nausea.  Dose: 4 mg            Allergies  Allergies   Allergen Reactions   • Nkda [No Known Drug Allergy]        DIET  Orders Placed This Encounter   Procedures   • Diet Order Diet: Consistent CHO (Diabetic)     Standing Status:   Standing     Number of Occurrences:   1     Order Specific Question:   Diet:     Answer:   Consistent CHO (Diabetic) [4]   • Discontinue Diet Tray     Standing Status:   Standing     Number of Occurrences:   1       ACTIVITY  As tolerated.  Weight bearing as tolerated    CONSULTATIONS  Critical care  Interventional radiology    PROCEDURES  EKOS    LABORATORY  Lab Results   Component Value Date    SODIUM 130 (L) 04/16/2022    POTASSIUM 4.6 04/16/2022    CHLORIDE 102 04/16/2022    CO2 17 (L) 04/16/2022    GLUCOSE 166 (H) 04/16/2022    BUN 27 (H) 04/16/2022    CREATININE 0.66 04/16/2022    CREATININE 0.6 11/09/2008        Lab Results    Component Value Date    WBC 10.6 04/16/2022    HEMOGLOBIN 9.0 (L) 04/16/2022    HEMATOCRIT 30.7 (L) 04/16/2022    PLATELETCT 183 04/16/2022        Total time of the discharge process exceeds 35 minutes.

## 2022-04-16 NOTE — PROGRESS NOTES
Pt dc'd home. IV was removed. Home oxygen was delivered to pt's room, pt was educated regarding safe use of home Oxygen, pt is on 2 L nasal cannula.  Pt left unit via wheelchair with 2 daughters. Personal belongings with pt when leaving unit. Pt given discharge instructions prior to leaving unit including when to follow-up; verbalizes understanding. Copy of discharge instructions with pt and in the chart.    Meds to bed delivered to pt at bedside, all prescriptions with pt at this time

## 2022-04-16 NOTE — DISCHARGE PLANNING
Received Choice form at 1059  Agency/Facility Name: [facility]  Referral sent per Choice form at 1100    1200- .Spoke To: Vimal  Agency/Facility Name: [facility]  Plan or Request: referral currently in review     1210- Spoke To: Beba  Agency/Facility Name: [facility]  Plan or Request: approved, 02 will be brought to bedside this afternoon.

## 2022-04-16 NOTE — CARE PLAN
The patient is Stable - Low risk of patient condition declining or worsening    Shift Goals  Clinical Goals: stable VS and labs  Patient Goals: go home  Family Goals: n/a      Problem: Knowledge Deficit - Standard  Goal: Patient and family/care givers will demonstrate understanding of plan of care, disease process/condition, diagnostic tests and medications  Outcome: Progressing     Problem: Fall Risk  Goal: Patient will remain free from falls  Outcome: Progressing     Problem: Hemodynamics  Goal: Patient's hemodynamics, fluid balance and neurologic status will be stable or improve  Outcome: Progressing     Problem: Respiratory  Goal: Patient will achieve/maintain optimum respiratory ventilation and gas exchange  Outcome: Progressing

## 2022-04-16 NOTE — PROGRESS NOTES
Diabetes education: Met with pt this afternoon. Please see consult note.  Plan: Pt will need prescriptions for One touch verio meter ( meter given but needs order for coverage), strips and lancets. If wanted to keep on NPH please order insulin pens at discharge and remind pt need for 3 meals and hs snack as NPH peaks at lunch and HS. If possible change to Basaglar kwik pens, and ceci pen needles at dc ( covered by her insurance). Unfortunately her insurance only covers vials ( Admelog) for fast acting (without a PA) so pt would need scale written out and for ac only if needed on dc Please order for 30 days ( in arleth) and 1 refill. Please use diabetes supplies, F2 for correct meter, supplies and dx code and please send to [facility].  If to go home on vials pt will need to be taught to draw up and inject with nursing. Please have pt stick her finger and give her insulin with nursing.

## 2022-04-16 NOTE — DISCHARGE PLANNING
Meds-to-Beds: Discharge prescription orders listed below delivered to patient's bedside. RN notified. Patient counseled.      Medication Sig Dispense Refill   • metformin (GLUCOPHAGE) 1000 MG tablet Take 1 Tablet by mouth 2 times a day with meals. 60 Tablet 1   • ferrous sulfate 325 (65 Fe) MG EC tablet Take 1 Tablet by mouth every morning with breakfast. 30 Tablet 1   • apixaban (ELIQUIS) 5mg Tab Take 2 Tablets by mouth 2 times a day for 6 days. Indications: DVT/PE 24 Tablet 0   • insulin glargine (INSULIN GLARGINE) 100 UNIT/ML Solution Pen-injector injection Inject 15 Units under the skin every evening. 9 mL 1   • Blood Glucose Monitoring Suppl (BLOOD GLUCOSE MONITOR SYSTEM) w/Device Kit Test blood sugar as recommended by provider. 1 Kit 0   • glucose blood strip Test blood sugar three times daily. 100 Strip 1   • Lancets Use one One Touch Verio lancet to test blood sugar three times daily. 100 Each 1   • Alcohol Swabs Wipe site with prep pad prior to injection. 100 Each 1   • Insulin Pen Needle 32 G x 4 mm Use one pen needle in pen device to inject insulin once daily. 100 Each 1      Maci Tate, Pharmacy Intern

## 2022-04-16 NOTE — PROGRESS NOTES
Bedside report received and patient care assumed. Pt is resting in bed, A&O4, with no complaints of pain, and is on 1L nasal canula. Pt is medical. All fall precautions are in place, belongings at bedside table.  Pt was updated on POC, no questions or concerns. Pt educated on use of call light for assistance. Will continue to monitor.

## 2022-04-18 LAB
BACTERIA BLD CULT: NORMAL
BACTERIA BLD CULT: NORMAL
SIGNIFICANT IND 70042: NORMAL
SIGNIFICANT IND 70042: NORMAL
SITE SITE: NORMAL
SITE SITE: NORMAL
SOURCE SOURCE: NORMAL
SOURCE SOURCE: NORMAL

## 2022-05-16 ENCOUNTER — OFFICE VISIT (OUTPATIENT)
Dept: MEDICAL GROUP | Facility: CLINIC | Age: 52
End: 2022-05-16
Payer: MEDICAID

## 2022-05-16 VITALS
WEIGHT: 260.2 LBS | DIASTOLIC BLOOD PRESSURE: 109 MMHG | HEART RATE: 115 BPM | HEIGHT: 59 IN | BODY MASS INDEX: 52.45 KG/M2 | SYSTOLIC BLOOD PRESSURE: 180 MMHG | OXYGEN SATURATION: 91 %

## 2022-05-16 DIAGNOSIS — D64.9 ANEMIA, UNSPECIFIED TYPE: ICD-10-CM

## 2022-05-16 DIAGNOSIS — I10 HYPERTENSION, UNSPECIFIED TYPE: ICD-10-CM

## 2022-05-16 DIAGNOSIS — F43.21 GRIEF: ICD-10-CM

## 2022-05-16 DIAGNOSIS — I26.99 PULMONARY EMBOLISM, BILATERAL (HCC): ICD-10-CM

## 2022-05-16 DIAGNOSIS — Z09 HOSPITAL DISCHARGE FOLLOW-UP: Primary | ICD-10-CM

## 2022-05-16 DIAGNOSIS — E11.65 TYPE 2 DIABETES MELLITUS WITH HYPERGLYCEMIA, WITHOUT LONG-TERM CURRENT USE OF INSULIN (HCC): ICD-10-CM

## 2022-05-16 DIAGNOSIS — I26.92 SADDLE EMBOLUS OF PULMONARY ARTERY WITHOUT ACUTE COR PULMONALE, UNSPECIFIED CHRONICITY (HCC): ICD-10-CM

## 2022-05-16 DIAGNOSIS — Z12.11 SCREENING FOR COLON CANCER: ICD-10-CM

## 2022-05-16 DIAGNOSIS — B37.49 CANDIDIASIS OF PERINEUM: ICD-10-CM

## 2022-05-16 PROCEDURE — 99204 OFFICE O/P NEW MOD 45 MIN: CPT | Mod: GC | Performed by: STUDENT IN AN ORGANIZED HEALTH CARE EDUCATION/TRAINING PROGRAM

## 2022-05-16 RX ORDER — FLUCONAZOLE 150 MG/1
150 TABLET ORAL DAILY
Qty: 1 TABLET | Refills: 0 | Status: SHIPPED | OUTPATIENT
Start: 2022-05-16

## 2022-05-16 RX ORDER — PAROXETINE HYDROCHLORIDE 20 MG/1
TABLET, FILM COATED ORAL
Qty: 90 TABLET | Refills: 3 | Status: SHIPPED | OUTPATIENT
Start: 2022-05-16

## 2022-05-16 RX ORDER — PAROXETINE HYDROCHLORIDE 20 MG/1
TABLET, FILM COATED ORAL
COMMUNITY
End: 2022-05-16 | Stop reason: SDUPTHER

## 2022-05-16 RX ORDER — NYSTATIN 100000 U/G
1 CREAM TOPICAL 2 TIMES DAILY
Qty: 30 G | Refills: 2 | Status: SHIPPED | OUTPATIENT
Start: 2022-05-16

## 2022-05-16 ASSESSMENT — ENCOUNTER SYMPTOMS
FEVER: 0
BLURRED VISION: 0
PALPITATIONS: 0
INSOMNIA: 0
WEAKNESS: 0
SPUTUM PRODUCTION: 0
SINUS PAIN: 0
CHILLS: 0
CONSTIPATION: 0
ABDOMINAL PAIN: 0
FOCAL WEAKNESS: 0
DIARRHEA: 0
DEPRESSION: 1
WEIGHT LOSS: 0
DIZZINESS: 0
WHEEZING: 0
HEADACHES: 0
VOMITING: 0
HEMOPTYSIS: 0
BLOOD IN STOOL: 0
COUGH: 0
SHORTNESS OF BREATH: 1
NAUSEA: 0
NERVOUS/ANXIOUS: 1

## 2022-05-16 ASSESSMENT — FIBROSIS 4 INDEX: FIB4 SCORE: 1.14

## 2022-05-16 ASSESSMENT — LIFESTYLE VARIABLES: SUBSTANCE_ABUSE: 0

## 2022-05-16 NOTE — LETTER
May 16, 2022      [place] Whom it may concern,  Alis Faye has been evaluated by me in the [facility] for her ongoing medical conditions. I do believe at this time she is capable of returning to work given that she has ample relief from standing and walking long distances. She should have the option to sit if needed throughout her day. She also requires time during the day to tend to her health care needs which could be done over a lunch break and other scheduled breaks during the day if available.       Please contact the office with any concerns or questions.        Renetta Cortes M.D.

## 2022-05-16 NOTE — PROGRESS NOTES
Subjective:     CC:  The primary encounter diagnosis was Hospital discharge follow-up. Diagnoses of Grief, Saddle embolus of pulmonary artery without acute cor pulmonale, unspecified chronicity (HCC), Type 2 diabetes mellitus with hyperglycemia, without long-term current use of insulin (HCC), Candidiasis of perineum, Pulmonary embolism, bilateral (HCC), and Screening for colon cancer were also pertinent to this visit.    HISTORY OF THE PRESENT ILLNESS: Patient is a 51 y.o. female. This pleasant patient is here today to establish care and discuss her recent hospitalization. His/her prior PCP was at [facility].     Hospitalized  through 22 with mild DKA and saddle pulmonary embolism complicated by right heart strain, also diagnosed with left proximal femoral occlusive DVT.  She was initially admitted to the intensive care unit on insulin drip and heparin drip.  She underwent EKOS on 2022 with improvement in her oxygen requirements.  Her DKA resolved and she was transitioned to 15 units subcutaneous insulin and she received diabetic teaching.  Discharged on Elliquis and Insulin with iron supplementation for microcytic anemia.     Problem   Grief    Has suffered the loss of many family members over the last few months and her  of 35 years has split from her.      Anemia    Hgb 9.0, MCV as low as 75 with low iron levels, normal ferritin and TIBC, appropriate reticulocyte response. Asymptomatic at this time     Pulmonary Embolism, Bilateral (Hcc)    First history of blood clot in her life. No history of DVT. 6 hour car trip in  prior to her hospitalization. No history of cancers, liver abnormalities, estrogen use. Not a smoker personally, but lives with a smoker.     Sister with endometrial cancer  Father with colon cancer  No personal history of cancer  No smoking history, however  was a smoker   , LMP , always regular periods, no dysmenorrhea or menorrhagia  history     Type 2 Diabetes Mellitus With Hyperglycemia, Without Long-Term Current Use of Insulin (Hcc)    T2DM poorly controlled, 11.8 in hospital 4/12/22  Home BG monitoring in 155-115 reportedly, did not bring log today.     Breakfast: Today: Cereal, wheaties, banana, oranges  Lunch: no lunch today, yesterday ate a chicken salad  Dinner: Taco, with low carb tortillas    Has cut out soda entirely, previously drinking 3-4/day    Exercise is difficult with new breathing problems, but improving. Discussed initiation of moderate intensity activity such as walking.      Saddle Embolus of Pulmonary Artery (Hcc)    Currently on elliquis 5 mg BID, good compliance  Uses 1 lpm O2, SOB is significantly improving. Denies any CP, increased WOB. No cough unless she is walking a lot.             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   Plans to get a home pulse oximeter, does not currently have one         Health Maintenance:   Mammogram: about 1 year ago, normal  Just saw eye doctor, no changes  Last pap smear: 2 years ago, reportedly abnormal 2 years ago, needs   Colonoscopy: never done  Urine microalbumin/cr due    ROS:   Review of Systems   Constitutional: Negative for chills, fever, malaise/fatigue and weight loss.   HENT: Negative for congestion, hearing loss and sinus pain.    Eyes: Negative for blurred vision.   Respiratory: Positive for shortness of breath. Negative for cough, hemoptysis, sputum production and wheezing.    Cardiovascular: Negative for chest pain, palpitations and leg swelling.   Gastrointestinal: Negative for abdominal pain, blood in stool, constipation, diarrhea, nausea and vomiting.   Genitourinary: Negative for dysuria, frequency and urgency.   Skin: Negative for rash.   Neurological: Negative for dizziness, focal weakness, weakness and headaches.   Psychiatric/Behavioral: Positive for depression. Negative for substance abuse and suicidal ideas. The patient is nervous/anxious. The  "patient does not have insomnia.          Objective:     Exam: BP (!) 180/109 (BP Location: Left arm, Patient Position: Sitting, BP Cuff Size: Large adult)   Pulse (!) 115   Ht 1.499 m (4' 11\")   Wt 118 kg (260 lb 3.2 oz)   SpO2 91%  Body mass index is 52.55 kg/m².    Gen: Healthy appearing, well-developed, NAD.  Heent: NC/AT; PERRL, EOMI. No discharge or redness; External ears are normal. Normal Tms. Normal nares. MMM. Normal gums, mucosa, palate,. Good dentition.   Neck: Supple, with no masses.  CV: RRR, no m/r/g.  Pulm: CTAB, no crackles or rhonchi  Abd: Soft, NT/ND, NBS, no masses or organomegaly.  Gu: N/A  Skin: Warm, well perfused. No skin rashes or abnormal lesions.  Msk: No deformities or signs of scoliosis. Normal gait.  Ext: No clubbing, cyanosis. 1+ edema to RLE to ankle and 2+ edema to LLE to mid calf.  Neuro: Ambulating with no limitations. Normal muscle strength and tone. No focal deficits.  Psych: Good Judgment. AOx3. Normal memory, mood, and appropriate affect.      Labs: Reviewed labs from hospitalization including improving mild DKA with acidosis HCO3 17 on discharge, hyponatremia of 130, and microcytic anemia with Hgb 9.0 on 4/16/22    A1C 11.8    Assessment & Plan:   51 y.o. female with the following -    Problem List Items Addressed This Visit     Type 2 diabetes mellitus with hyperglycemia, without long-term current use of insulin ([facility])     A1C next due mid July.   Home glucose per patient report is close to goal. She is continuing to cut out carbohydrates and increase dietary vegetables, decrease saturated fats. S/p dietary education.     Start Victoza 0.6 mg   Continue nightly insulin, skip insulin when initiating victoza for first time and continue to monitor BG three times daily. If BG climbs to over 200 restart insulin at 10 units/night--titrate to -150 goal. Patient feels comfortable with this     Follow up in one month and bring BG log.              Relevant Medications    " liraglutide (VICTOZA) 18 MG/3ML Solution Pen-injector    Other Relevant Orders    Lipid Profile    MICROALBUMIN, URINE    Saddle embolus of pulmonary artery (HCC)    Anemia     Consistent with LIO  Continue iron supplementation with Ferrous sulfate daily or every other day for improved side effect profile and improved absorbtion           Relevant Orders    CBC WITH DIFFERENTIAL    IRON/TOTAL IRON BIND    FERRITIN    Pulmonary embolism, bilateral (HCC)     Continue anticoagulation for at least 6 months given that she had a hemodynamically significant PE.      Will start with routine cancer screenings and consider procoaguability workup at next visit in 1 month.     Hypoxia has completely resolved and did not desaturate on the walk testing done today in clinic at all. Patient will purchase home O2 monitoring device. No renewal needed at this time.   Recommend slow return to physical activity, avoidance of extremely vigorous activity, but a treadmill at home is reasonable start.            Grief     Grief, moderate deprerssion  Restart paxil 20 mg daily and resources for therapy as well as crisis numbers given to patient.     Patient denies any ideation of self harm, SI, HI, AVH and feels she has a good support system           HTN (hypertension)     Home monitoring over the next two weeks and recheck in office at follow up visit. Proper technique discussed             Other Visit Diagnoses     Hospital discharge follow-up    -  Primary    Candidiasis of perineum        Relevant Medications    fluconazole (DIFLUCAN) 150 MG tablet    Screening for colon cancer        Relevant Orders    Referral to GI for Colonoscopy            Renetta Enriquez MD  PGY-2 [employer] [facility] Resident

## 2022-05-16 NOTE — LETTER
May 16, 2022    To Whom It May Concern:         This is confirmation that Alis Yunier attended her scheduled appointment with Renetta Cortes M.D. on 5/16/22.         If you have any questions please do not hesitate to call me at the phone number listed below.    Sincerely,          Alisha Gatica, Med Ass't  [phone]

## 2022-05-17 NOTE — ASSESSMENT & PLAN NOTE
A1C next due mid July.   Home glucose per patient report is close to goal. She is continuing to cut out carbohydrates and increase dietary vegetables, decrease saturated fats. S/p dietary education.     Start Victoza 0.6 mg   Continue nightly insulin, skip insulin when initiating victoza for first time and continue to monitor BG three times daily. If BG climbs to over 200 restart insulin at 10 units/night--titrate to -150 goal. Patient feels comfortable with this     Follow up in one month and bring BG log.

## 2022-05-17 NOTE — ASSESSMENT & PLAN NOTE
Grief, moderate deprerssion  Restart paxil 20 mg daily and resources for therapy as well as crisis numbers given to patient.     Patient denies any ideation of self harm, SI, HI, AVH and feels she has a good support system

## 2022-05-17 NOTE — ASSESSMENT & PLAN NOTE
Consistent with LIO  Continue iron supplementation with Ferrous sulfate daily or every other day for improved side effect profile and improved absorbtion

## 2022-05-17 NOTE — ASSESSMENT & PLAN NOTE
Home monitoring over the next two weeks and recheck in office at follow up visit. Proper technique discussed

## 2022-05-17 NOTE — ASSESSMENT & PLAN NOTE
Continue anticoagulation for at least 6 months given that she had a hemodynamically significant PE.      Will start with routine cancer screenings and consider procoaguability workup at next visit in 1 month.     Hypoxia has completely resolved and did not desaturate on the walk testing done today in clinic at all. Patient will purchase home O2 monitoring device. No renewal needed at this time.   Recommend slow return to physical activity, avoidance of extremely vigorous activity, but a treadmill at home is reasonable start.

## 2022-06-13 NOTE — CARE PLAN
Briana Richardson, sent in today at OV.    The patient is Watcher - Medium risk of patient condition declining or worsening    Shift Goals  Clinical Goals: Q1 hour FSBG; hemodynamic stability; mobilize  Patient Goals: Rest  Family Goals: No family at bedside    Progress made toward(s) clinical / shift goals:    Problem: Hemodynamics  Goal: Patient's hemodynamics, fluid balance and neurologic status will be stable or improve  Outcome: Not Progressing  Note: Patient BP continues to fluctuate, placement of arterial line by APRN. Patient tolerated procedure well. Patient educated on plan to monitor BP and update MD as needed.    Plan to assess patient blood sugar Q1 hour and manage insulin gtt. Patient agreeable to plan of care.     Problem: Mobility  Goal: Patient's capacity to carry out activities will improve  Outcome: Not Progressing  Note: Patient refusing mobility at this time, patient educated on importance of mobility and plan to mobilize on dayshift to EOB.       Patient is not progressing towards the following goals:      Problem: Hemodynamics  Goal: Patient's hemodynamics, fluid balance and neurologic status will be stable or improve  Outcome: Not Progressing  Note: Patient BP continues to fluctuate, placement of arterial line by APRN. Patient tolerated procedure well. Patient educated on plan to monitor BP and update MD as needed.    Plan to assess patient blood sugar Q1 hour and manage insulin gtt. Patient agreeable to plan of care.     Problem: Mobility  Goal: Patient's capacity to carry out activities will improve  Outcome: Not Progressing  Note: Patient refusing mobility at this time, patient educated on importance of mobility and plan to mobilize on dayshift to EOB.

## 2022-07-22 ENCOUNTER — PHARMACY VISIT (OUTPATIENT)
Dept: PHARMACY | Facility: MEDICAL CENTER | Age: 52
End: 2022-07-22
Payer: COMMERCIAL

## 2022-07-22 PROCEDURE — RXMED WILLOW AMBULATORY MEDICATION CHARGE: Performed by: HOSPITALIST

## 2023-11-29 NOTE — FACE TO FACE
"Face to Face Note  -  Durable Medical Equipment    Tay Villareal M.D. - NPI: [id number]  I certify that this patient is under my care and that they had a durable medical equipment(DME)face to face encounter by myself that meets the physician DME face-to-face encounter requirements with this patient on:    Date of encounter:   Patient:                    MRN:                       YOB: 2021  Alis Faye  [id number]  1970     The encounter with the patient was in whole, or in part, for the following medical condition, which is the primary reason for durable medical equipment:  Diabetes and Other - pulmonary embolism, pulmonary hypertension    I certify that, based on my findings, the following durable medical equipment is medically necessary:  Oxygen.    HOME O2 Saturation Measurements:(Values must be present for Home Oxygen orders)  Room air sat at rest: 88     With liters of O2: 1, O2 sat at rest with O2: 92  With Liters of O2: 1, O2 sat with amb with O2 : 93  Is the patient mobile?: Yes    My Clinical findings support the need for the above equipment due to:  Hypoxia    Supporting Symptoms: The patient requires supplemental oxygen, as the following interventions have been tried with limited or no improvement: \"Ambulation with oximetry and \"Incentive spirometry    If patient feels more short of breath, they can go up to 6 liters per minute and contact healthcare provider.  "
Patient is dead based upon Brain Death Criteria

## 2024-01-11 ENCOUNTER — HOSPITAL ENCOUNTER (EMERGENCY)
Facility: MEDICAL CENTER | Age: 54
End: 2024-01-11
Attending: EMERGENCY MEDICINE

## 2024-01-11 ENCOUNTER — HOSPITAL ENCOUNTER (EMERGENCY)
Facility: MEDICAL CENTER | Age: 54
End: 2024-01-11
Attending: STUDENT IN AN ORGANIZED HEALTH CARE EDUCATION/TRAINING PROGRAM

## 2024-01-11 VITALS
BODY MASS INDEX: 48.98 KG/M2 | OXYGEN SATURATION: 97 % | DIASTOLIC BLOOD PRESSURE: 90 MMHG | TEMPERATURE: 97 F | HEART RATE: 96 BPM | SYSTOLIC BLOOD PRESSURE: 168 MMHG | WEIGHT: 242.51 LBS | RESPIRATION RATE: 16 BRPM

## 2024-01-11 VITALS
SYSTOLIC BLOOD PRESSURE: 146 MMHG | HEART RATE: 91 BPM | HEIGHT: 58 IN | WEIGHT: 242.51 LBS | RESPIRATION RATE: 18 BRPM | BODY MASS INDEX: 50.9 KG/M2 | TEMPERATURE: 97.9 F | OXYGEN SATURATION: 94 % | DIASTOLIC BLOOD PRESSURE: 67 MMHG

## 2024-01-11 DIAGNOSIS — L50.9 URTICARIA: ICD-10-CM

## 2024-01-11 DIAGNOSIS — T78.40XA ALLERGIC REACTION, INITIAL ENCOUNTER: ICD-10-CM

## 2024-01-11 PROCEDURE — 700102 HCHG RX REV CODE 250 W/ 637 OVERRIDE(OP): Performed by: EMERGENCY MEDICINE

## 2024-01-11 PROCEDURE — 99283 EMERGENCY DEPT VISIT LOW MDM: CPT

## 2024-01-11 PROCEDURE — A9270 NON-COVERED ITEM OR SERVICE: HCPCS | Performed by: EMERGENCY MEDICINE

## 2024-01-11 PROCEDURE — 700102 HCHG RX REV CODE 250 W/ 637 OVERRIDE(OP): Performed by: STUDENT IN AN ORGANIZED HEALTH CARE EDUCATION/TRAINING PROGRAM

## 2024-01-11 PROCEDURE — 99282 EMERGENCY DEPT VISIT SF MDM: CPT

## 2024-01-11 PROCEDURE — A9270 NON-COVERED ITEM OR SERVICE: HCPCS | Performed by: STUDENT IN AN ORGANIZED HEALTH CARE EDUCATION/TRAINING PROGRAM

## 2024-01-11 RX ORDER — DEXAMETHASONE 4 MG/1
4 TABLET ORAL ONCE
Status: COMPLETED | OUTPATIENT
Start: 2024-01-11 | End: 2024-01-11

## 2024-01-11 RX ORDER — CETIRIZINE HYDROCHLORIDE 10 MG/1
10 TABLET ORAL 2 TIMES DAILY
Qty: 30 TABLET | Refills: 0 | Status: SHIPPED | OUTPATIENT
Start: 2024-01-11 | End: 2024-01-11

## 2024-01-11 RX ORDER — LORATADINE 10 MG/1
10 TABLET ORAL DAILY
Status: DISCONTINUED | OUTPATIENT
Start: 2024-01-11 | End: 2024-01-11 | Stop reason: HOSPADM

## 2024-01-11 RX ORDER — DIPHENHYDRAMINE HCL 25 MG
25 TABLET ORAL ONCE
Status: COMPLETED | OUTPATIENT
Start: 2024-01-11 | End: 2024-01-11

## 2024-01-11 RX ORDER — FAMOTIDINE 20 MG/1
20 TABLET, FILM COATED ORAL ONCE
Status: COMPLETED | OUTPATIENT
Start: 2024-01-11 | End: 2024-01-11

## 2024-01-11 RX ORDER — CETIRIZINE HYDROCHLORIDE 10 MG/1
10 TABLET ORAL 2 TIMES DAILY
Qty: 30 TABLET | Refills: 0 | Status: SHIPPED | OUTPATIENT
Start: 2024-01-11

## 2024-01-11 RX ORDER — CETIRIZINE HYDROCHLORIDE 10 MG/1
10 TABLET ORAL ONCE
Status: COMPLETED | OUTPATIENT
Start: 2024-01-11 | End: 2024-01-11

## 2024-01-11 RX ADMIN — DEXAMETHASONE 4 MG: 4 TABLET ORAL at 16:41

## 2024-01-11 RX ADMIN — FAMOTIDINE 20 MG: 20 TABLET, FILM COATED ORAL at 17:00

## 2024-01-11 RX ADMIN — CETIRIZINE HYDROCHLORIDE 10 MG: 10 TABLET, FILM COATED ORAL at 23:12

## 2024-01-11 RX ADMIN — DIPHENHYDRAMINE HYDROCHLORIDE 25 MG: 25 TABLET ORAL at 23:12

## 2024-01-11 ASSESSMENT — FIBROSIS 4 INDEX
FIB4 SCORE: 1.18
FIB4 SCORE: 1.18

## 2024-01-11 NOTE — ED TRIAGE NOTES
Pt ambulatory to triage c/o allergic reaction. Pt states she ate some shrimp last night and she woke up this am with a rash to arms and felt like her tongue was swelling. Pt airway patent denies difficulty breathing and states swelling to tongue has gone down. Nad.

## 2024-01-12 NOTE — ED TRIAGE NOTES
Chief Complaint   Patient presents with    Rash     Started to have rashes last night and gotten worse as claimed. States she is not on any new medications; doesn't;t recall any food that might contribute to it, denies nausea/vomiting/SOB; Visible  bumpy rashes on face,chest and arms       Pt to triage ambulatory for above complaint. States she had shrimps last night but it's not her first time eating them. Aox4 GCS15    Pt back to [facility], educated on triage process and encourage to alert staff of any changes.     Vitals:    01/11/24 2138   BP: (!) 173/93   Pulse: 93   Resp: 18   Temp: 36.6 °C (97.9 °F)   SpO2: 97%

## 2024-01-12 NOTE — ED NOTES
Patient discharged home per ERP.  Discharge teaching and education discussed with patient. POC discussed.   Patient verbalized understanding of discharge teaching and education. No other questions at this time.     RX x 2 sent to pharmacy by ERP.     VSS. Patient alert and oriented. Patient arranged ride for self. Able to ambulate off unit safely with steady gait.

## 2024-01-12 NOTE — ED NOTES
Discharge instructions reviewed with patient and signed.  They were instructed on how to  prescriptions. They verbalized understanding of follow up instructions. All belongings with patient. They ambulate with a steady gait

## 2024-01-12 NOTE — ED PROVIDER NOTES
ED Provider Note    CHIEF COMPLAINT  Chief Complaint   Patient presents with    Rash     Started to have rashes last night and gotten worse as claimed. States she is not on any new medications; doesn't;t recall any food that might contribute to it, denies nausea/vomiting/SOB; Visible  bumpy rashes on face,chest and arms       EXTERNAL RECORDS REVIEWED  Internal emergency department note from earlier today reviewed, presentation for urticarial rash    Reviewed outpatient primary care physician note, patient has a history of bilateral pulmonary emboli diagnosed in 2022    HPI/ROS  LIMITATION TO HISTORY   Select: : None  OUTSIDE HISTORIAN(S):  Daughter providing clinically relevant collateral history    Alis Faye is a 53 y.o. female with past medical history of diabetes, hypertension, obesity presenting to the emergency department for a rash.  Patient was seen in this emergency department approximately 3 hours earlier, diagnosed with an urticarial rash, given a dose of Decadron and prescribed cetirizine and epinephrine autoinjector.  Patient says that she drove home and then subsequently drove back to the emergency department because she felt that the rash was changing.  Noticed that the rash is intermittently affecting her arms, her legs, her chest, noticed a small rash on her right cheek and behind her right ear.  Says that the steroids did not seem to help. did not go to the pharmacy to  the antihistamine. No prior history of allergic reaction.  Denies any shortness of breath, wheezing, mouth or tongue swelling, nausea or vomiting.    PAST MEDICAL HISTORY   has a past medical history of Anemia, Diabetes, HTN (hypertension) (5/16/2022), and Obesity.    SURGICAL HISTORY  patient denies any surgical history    FAMILY HISTORY  No family history on file.    SOCIAL HISTORY  Social History     Tobacco Use    Smoking status: Never    Smokeless tobacco: Never   Vaping Use    Vaping Use: Never used   Substance  "and Sexual Activity    Alcohol use: No    Drug use: No    Sexual activity: Not on file       CURRENT MEDICATIONS  Home Medications       Reviewed by Aye Newman R.N. (Registered Nurse) on 01/11/24 at 2148  Med List Status: Not Addressed     Medication Last Dose Status   Alcohol Swabs  Active   apixaban (ELIQUIS) 5mg Tab  Active   Blood Glucose Monitoring Suppl (BLOOD GLUCOSE MONITOR SYSTEM) w/Device Kit  Active   cetirizine (ZYRTEC) 10 MG Tab  Active   EPINEPHrine 0.3 MG/0.3ML Solution Prefilled Syringe  Active   ferrous sulfate 325 (65 Fe) MG EC tablet  Active   fluconazole (DIFLUCAN) 150 MG tablet  Active   glucose blood strip  Active   insulin glargine (INSULIN GLARGINE) 100 UNIT/ML Solution Pen-injector injection  Active   Insulin Pen Needle 32 G x 4 mm  Active   Lancets  Active   metformin (GLUCOPHAGE) 1000 MG tablet  Active   nystatin (MYCOSTATIN) 979142 UNIT/GM Cream topical cream  Active   PARoxetine (PAXIL) 20 MG Tab  Active                    ALLERGIES  Allergies   Allergen Reactions    Nkda [No Known Drug Allergy]        PHYSICAL EXAM  VITAL SIGNS: BP (!) 173/93   Pulse 93   Temp 36.6 °C (97.9 °F) (Temporal)   Resp 18   Ht 1.473 m (4' 10\")   Wt 110 kg (242 lb 8.1 oz)   SpO2 97%   BMI 50.68 kg/m²    General: Well- appearing , non-toxic, no acute distress  Neuro: oriented x 3, moving all extremities.   HEENT:   - Head: Normocephalic, atraumatic  - Eyes: PERRL  - Ears/Nose: normal external nose and ears  - Mouth: moist mucosal membranes, no sublingual edema  Resp: clear to auscultation, no increased work of breathing, no wheeze  CV: Regular rate and rhythm  Abd: Soft, non-tender, non-distended  Skin: Small raised bumps to the right side of the face barely palpable, one area of urticaria in the left upper chest  Extremities: No peripheral edema  Psych: lucid and conversational       DIAGNOSTIC STUDIES / PROCEDURES    EKG  My independent EKG interpretation:  Results for orders placed or performed " during the hospital encounter of 22   EKG (NOW)   Result Value Ref Range    Report       [facility] Emergency Dept.    Test Date:  2022  Pt Name:    TERESO TERESA              Department: ER  MRN:        [id number]                      Room:  Gender:     Female                       Technician: [id number]  :        1970                   Requested By:ER TRIAGE PROTOCOL  Order #:    [id number]                    Reading MD: EMMA PEREZ MD    Measurements  Intervals                                Axis  Rate:       119                          P:          49  IN:         133                          QRS:        122  QRSD:       92                           T:          -27  QT:         343  QTc:        483    Interpretive Statements  Sinus tachycardia  Inferior infarct, old  Probable anterior infarct, age indeterminate  Compared to ECG 2010 13:38:51  Myocardial infarct finding now present  Sinus rhythm no longer present  T-wave abnormality no longer present  Electronically Signed On 2022 22:30:10 PDT by EMMA BYRD MD         LABS  Results for orders placed or performed during the hospital encounter of 22   EC-ECHOCARDIOGRAM COMPLETE W/O CONT   Result Value Ref Range    Eject.Frac. MOD BP 35.77     Eject.Frac. MOD 4C 34.67     Eject.Frac. MOD 2C 37.25     Left Ventrical Ejection Fraction 45    Lactic acid (lactate)   Result Value Ref Range    Lactic Acid 3.5 (H) 0.5 - 2.0 mmol/L   Lactic acid (lactate): Repeat if initial lactic acid result is greater than 2   Result Value Ref Range    Lactic Acid 3.6 (H) 0.5 - 2.0 mmol/L   Lactic acid (lactate): Repeat if initial lactic acid result is greater than 2   Result Value Ref Range    Lactic Acid 2.6 (H) 0.5 - 2.0 mmol/L   CBC WITH DIFFERENTIAL   Result Value Ref Range    WBC 14.8 (H) 4.8 - 10.8 K/uL    RBC 4.77 4.20 - 5.40 M/uL    Hemoglobin 10.6 (L) 12.0 - 16.0 g/dL    Hematocrit 37.5 37.0 -  47.0 %    MCV 78.6 (L) 81.4 - 97.8 fL    MCH 22.2 (L) 27.0 - 33.0 pg    MCHC 28.3 (L) 33.6 - 35.0 g/dL    RDW 46.3 35.9 - 50.0 fL    Platelet Count 220 164 - 446 K/uL    MPV 10.5 9.0 - 12.9 fL    Neutrophils-Polys 78.10 (H) 44.00 - 72.00 %    Lymphocytes 13.60 (L) 22.00 - 41.00 %    Monocytes 7.20 0.00 - 13.40 %    Eosinophils 0.10 0.00 - 6.90 %    Basophils 0.10 0.00 - 1.80 %    Immature Granulocytes 0.90 0.00 - 0.90 %    Nucleated RBC 0.50 /100 WBC    Neutrophils (Absolute) 11.55 (H) 2.00 - 7.15 K/uL    Lymphs (Absolute) 2.01 1.00 - 4.80 K/uL    Monos (Absolute) 1.07 (H) 0.00 - 0.85 K/uL    Eos (Absolute) 0.01 0.00 - 0.51 K/uL    Baso (Absolute) 0.02 0.00 - 0.12 K/uL    Immature Granulocytes (abs) 0.13 (H) 0.00 - 0.11 K/uL    NRBC (Absolute) 0.08 K/uL   COMP METABOLIC PANEL   Result Value Ref Range    Sodium 131 (L) 135 - 145 mmol/L    Potassium 4.4 3.6 - 5.5 mmol/L    Chloride 97 96 - 112 mmol/L    Co2 14 (L) 20 - 33 mmol/L    Anion Gap 20.0 (H) 7.0 - 16.0    Glucose 541 (HH) 65 - 99 mg/dL    Bun 20 8 - 22 mg/dL    Creatinine 0.91 0.50 - 1.40 mg/dL    Calcium 8.2 (L) 8.5 - 10.5 mg/dL    AST(SGOT) 13 12 - 45 U/L    ALT(SGPT) 21 2 - 50 U/L    Alkaline Phosphatase 105 (H) 30 - 99 U/L    Total Bilirubin 0.5 0.1 - 1.5 mg/dL    Albumin 3.6 3.2 - 4.9 g/dL    Total Protein 7.3 6.0 - 8.2 g/dL    Globulin 3.7 (H) 1.9 - 3.5 g/dL    A-G Ratio 1.0 g/dL   URINALYSIS    Specimen: Urine   Result Value Ref Range    Color Yellow     Character Clear     Specific Gravity 1.035 <1.035    Ph 5.5 5.0 - 8.0    Glucose >=1000 (A) Negative mg/dL    Ketones 15 (A) Negative mg/dL    Protein 100 (A) Negative mg/dL    Bilirubin Negative Negative    Urobilinogen, Urine 1.0 Negative    Nitrite Negative Negative    Leukocyte Esterase Negative Negative    Occult Blood Negative Negative    Micro Urine Req Microscopic    URINE CULTURE(NEW)    Specimen: Urine   Result Value Ref Range    Significant Indicator POS (POS)     Source UR     Site -      Culture Result Usual urogen[place] ,000 cfu/mL (A)     Culture Result (A)      Streptococcus agalactiae (Group B)  10-50,000 cfu/mL     BLOOD CULTURE    Specimen: Peripheral; Blood   Result Value Ref Range    Significant Indicator NEG     Source BLD     Site PERIPHERAL     Culture Result No growth after 5 days of incubation.    BLOOD CULTURE    Specimen: Peripheral; Blood   Result Value Ref Range    Significant Indicator NEG     Source BLD     Site PERIPHERAL     Culture Result No growth after 5 days of incubation.    BETA-HYDROXYBUTYRIC ACID   Result Value Ref Range    beta-Hydroxybutyric Acid 2.98 (H) 0.02 - 0.27 mmol/L   VENOUS BLOOD GAS   Result Value Ref Range    Venous Bg Ph 7.18 (L) 7.31 - 7.45    Venous Bg Ph Temp Corrected 7.20 (L) 7.31 - 7.45    Venous Bg Pco2 41.4 41.0 - 51.0 mmHg    Venous Bg Pco2 Temp Corrected 39.6 (L) 41.0 - 51.0 mmHg    Venous Bg Po2 18.9 (L) 25.0 - 40.0 mmHg    Venous Bg Po2 Temp Corrected 17.6 (L) 25.0 - 40.0 mmHg    Venous Bg O2 Saturation 16.9 %    Venous Bg Hco3 15 (L) 24 - 28 mmol/L    Venous Bg Base Excess -12 mmol/L    Body Temp 36.2 Centigrade   proBrain Natriuretic Peptide, NT   Result Value Ref Range    NT-proBNP 3961 (H) 0 - 125 pg/mL   TROPONIN   Result Value Ref Range    Troponin T 17 6 - 19 ng/L   MAGNESIUM   Result Value Ref Range    Magnesium 1.8 1.5 - 2.5 mg/dL   ESTIMATED GFR   Result Value Ref Range    GFR (CKD-EPI) 77 >60 mL/min/1.73 m 2   URINE MICROSCOPIC (W/UA)   Result Value Ref Range    WBC 2-5 /hpf    RBC 0-2 /hpf    Bacteria Few (A) None /hpf    Epithelial Cells Negative /hpf    Hyaline Cast 0-2 /lpf   VENOUS BLOOD GAS   Result Value Ref Range    Venous Bg Ph 7.30 (L) 7.31 - 7.45    Venous Bg Ph Temp Corrected 7.31 7.31 - 7.45    Venous Bg Pco2 28.7 (L) 41.0 - 51.0 mmHg    Venous Bg Pco2 Temp Corrected 28.3 (L) 41.0 - 51.0 mmHg    Venous Bg Po2 56.0 (H) 25.0 - 40.0 mmHg    Venous Bg Po2 Temp Corrected 54.8 (H) 25.0 - 40.0 mmHg    Venous Bg O2  Saturation 84.1 %    Venous Bg Hco3 14 (L) 24 - 28 mmol/L    Venous Bg Base Excess -11 mmol/L    Body Temp 36.7 Centigrade   aPTT   Result Value Ref Range    APTT 29.0 24.7 - 36.0 sec   Prothrombin Time   Result Value Ref Range    PT 15.9 (H) 12.0 - 14.6 sec    INR 1.31 (H) 0.87 - 1.13   Heparin Xa (Unfractionated)   Result Value Ref Range    Heparin Xa (UFH) <0.10 IU/mL   Comp Metabolic Panel   Result Value Ref Range    Sodium 130 (L) 135 - 145 mmol/L    Potassium 5.1 3.6 - 5.5 mmol/L    Chloride 99 96 - 112 mmol/L    Co2 15 (L) 20 - 33 mmol/L    Anion Gap 16.0 7.0 - 16.0    Glucose 461 (H) 65 - 99 mg/dL    Bun 22 8 - 22 mg/dL    Creatinine 1.01 0.50 - 1.40 mg/dL    Calcium 8.0 (L) 8.5 - 10.5 mg/dL    AST(SGOT) 21 12 - 45 U/L    ALT(SGPT) 21 2 - 50 U/L    Alkaline Phosphatase 101 (H) 30 - 99 U/L    Total Bilirubin 0.5 0.1 - 1.5 mg/dL    Albumin 3.4 3.2 - 4.9 g/dL    Total Protein 7.3 6.0 - 8.2 g/dL    Globulin 3.9 (H) 1.9 - 3.5 g/dL    A-G Ratio 0.9 g/dL   TROPONIN   Result Value Ref Range    Troponin T 22 (H) 6 - 19 ng/L   PROCALCITONIN   Result Value Ref Range    Procalcitonin 0.06 <0.25 ng/mL   PHOSPHORUS   Result Value Ref Range    Phosphorus 3.5 2.5 - 4.5 mg/dL   MAGNESIUM   Result Value Ref Range    Magnesium 1.7 1.5 - 2.5 mg/dL   ESTIMATED GFR   Result Value Ref Range    GFR (CKD-EPI) 68 >60 mL/min/1.73 m 2   Basic Metabolic Panel (BMP)   Result Value Ref Range    Sodium 134 (L) 135 - 145 mmol/L    Potassium 4.9 3.6 - 5.5 mmol/L    Chloride 103 96 - 112 mmol/L    Co2 15 (L) 20 - 33 mmol/L    Glucose 401 (H) 65 - 99 mg/dL    Bun 29 (H) 8 - 22 mg/dL    Creatinine 1.27 0.50 - 1.40 mg/dL    Calcium 8.4 (L) 8.5 - 10.5 mg/dL    Anion Gap 16.0 7.0 - 16.0   Phosphorus   Result Value Ref Range    Phosphorus 3.7 2.5 - 4.5 mg/dL   Magnesium   Result Value Ref Range    Magnesium 2.0 1.5 - 2.5 mg/dL   CBC with Differential   Result Value Ref Range    WBC 13.7 (H) 4.8 - 10.8 K/uL    RBC 4.53 4.20 - 5.40 M/uL    Hemoglobin  10.2 (L) 12.0 - 16.0 g/dL    Hematocrit 34.0 (L) 37.0 - 47.0 %    MCV 75.1 (L) 81.4 - 97.8 fL    MCH 22.5 (L) 27.0 - 33.0 pg    MCHC 30.0 (L) 33.6 - 35.0 g/dL    RDW 43.8 35.9 - 50.0 fL    Platelet Count 213 164 - 446 K/uL    MPV 10.5 9.0 - 12.9 fL    Neutrophils-Polys 75.20 (H) 44.00 - 72.00 %    Lymphocytes 14.60 (L) 22.00 - 41.00 %    Monocytes 9.20 0.00 - 13.40 %    Eosinophils 0.10 0.00 - 6.90 %    Basophils 0.20 0.00 - 1.80 %    Immature Granulocytes 0.70 0.00 - 0.90 %    Nucleated RBC 1.00 /100 WBC    Neutrophils (Absolute) 10.33 (H) 2.00 - 7.15 K/uL    Lymphs (Absolute) 2.01 1.00 - 4.80 K/uL    Monos (Absolute) 1.27 (H) 0.00 - 0.85 K/uL    Eos (Absolute) 0.01 0.00 - 0.51 K/uL    Baso (Absolute) 0.03 0.00 - 0.12 K/uL    Immature Granulocytes (abs) 0.09 0.00 - 0.11 K/uL    NRBC (Absolute) 0.14 K/uL   Heparin Anti-Xa   Result Value Ref Range    Heparin Xa (UFH) 0.56 IU/mL   ESTIMATED GFR   Result Value Ref Range    GFR (CKD-EPI) 51 (A) >60 mL/min/1.73 m 2   Basic Metabolic Panel (BMP)   Result Value Ref Range    Sodium 139 135 - 145 mmol/L    Potassium 3.5 (L) 3.6 - 5.5 mmol/L    Chloride 109 96 - 112 mmol/L    Co2 16 (L) 20 - 33 mmol/L    Glucose 106 (H) 65 - 99 mg/dL    Bun 28 (H) 8 - 22 mg/dL    Creatinine 1.06 0.50 - 1.40 mg/dL    Calcium 8.5 8.5 - 10.5 mg/dL    Anion Gap 14.0 7.0 - 16.0   Basic Metabolic Panel (BMP)   Result Value Ref Range    Sodium 135 135 - 145 mmol/L    Potassium 4.2 3.6 - 5.5 mmol/L    Chloride 107 96 - 112 mmol/L    Co2 16 (L) 20 - 33 mmol/L    Glucose 112 (H) 65 - 99 mg/dL    Bun 26 (H) 8 - 22 mg/dL    Creatinine 0.72 0.50 - 1.40 mg/dL    Calcium 8.1 (L) 8.5 - 10.5 mg/dL    Anion Gap 12.0 7.0 - 16.0   Phosphorus   Result Value Ref Range    Phosphorus 3.3 2.5 - 4.5 mg/dL   Magnesium   Result Value Ref Range    Magnesium 2.1 1.5 - 2.5 mg/dL   ESTIMATED GFR   Result Value Ref Range    GFR (CKD-EPI) 64 >60 mL/min/1.73 m 2   Prothrombin Time   Result Value Ref Range    PT 16.5 (H) 12.0 -  14.6 sec    INR 1.38 (H) 0.87 - 1.13   Heparin Xa (Unfractionated)   Result Value Ref Range    Heparin Xa (UFH) <0.10 IU/mL   CBC w/o Diff every 6 hours X2 (Begin 6 hours after onset of thrombolytic infusion)   Result Value Ref Range    WBC 17.5 (H) 4.8 - 10.8 K/uL    RBC 4.28 4.20 - 5.40 M/uL    Hemoglobin 9.6 (L) 12.0 - 16.0 g/dL    Hematocrit 32.5 (L) 37.0 - 47.0 %    MCV 75.9 (L) 81.4 - 97.8 fL    MCH 22.4 (L) 27.0 - 33.0 pg    MCHC 29.5 (L) 33.6 - 35.0 g/dL    RDW 45.1 35.9 - 50.0 fL    Platelet Count 166 164 - 446 K/uL    MPV 9.7 9.0 - 12.9 fL   Prothrombin Time   Result Value Ref Range    PT 17.1 (H) 12.0 - 14.6 sec    INR 1.44 (H) 0.87 - 1.13   Heparin Xa (Unfractionated)   Result Value Ref Range    Heparin Xa (UFH) <0.10 IU/mL   APTT   Result Value Ref Range    APTT 37.6 (H) 24.7 - 36.0 sec   ESTIMATED GFR   Result Value Ref Range    GFR (CKD-EPI) 101 >60 mL/min/1.73 m 2   CBC w/o Diff every 6 hours X2 (Begin 6 hours after onset of thrombolytic infusion)   Result Value Ref Range    WBC 16.5 (H) 4.8 - 10.8 K/uL    RBC 4.35 4.20 - 5.40 M/uL    Hemoglobin 9.7 (L) 12.0 - 16.0 g/dL    Hematocrit 33.6 (L) 37.0 - 47.0 %    MCV 77.2 (L) 81.4 - 97.8 fL    MCH 22.3 (L) 27.0 - 33.0 pg    MCHC 28.9 (L) 33.6 - 35.0 g/dL    RDW 45.7 35.9 - 50.0 fL    Platelet Count 180 164 - 446 K/uL    MPV 10.5 9.0 - 12.9 fL   Phosphorus   Result Value Ref Range    Phosphorus 3.6 2.5 - 4.5 mg/dL   Magnesium   Result Value Ref Range    Magnesium 2.1 1.5 - 2.5 mg/dL   Heparin Anti-Xa   Result Value Ref Range    Heparin Xa (UFH) 0.27 IU/mL   PERIPHERAL SMEAR REVIEW   Result Value Ref Range    Peripheral Smear Review see below    HEMOGLOBIN A1C   Result Value Ref Range    Glycohemoglobin 11.8 (H) 4.0 - 5.6 %    Est Avg Glucose 292 mg/dL   Comp Metabolic Panel   Result Value Ref Range    Sodium 134 (L) 135 - 145 mmol/L    Potassium 4.3 3.6 - 5.5 mmol/L    Chloride 105 96 - 112 mmol/L    Co2 17 (L) 20 - 33 mmol/L    Anion Gap 12.0 7.0 - 16.0     Glucose 131 (H) 65 - 99 mg/dL    Bun 28 (H) 8 - 22 mg/dL    Creatinine 0.85 0.50 - 1.40 mg/dL    Calcium 8.1 (L) 8.5 - 10.5 mg/dL    AST(SGOT) 22 12 - 45 U/L    ALT(SGPT) 28 2 - 50 U/L    Alkaline Phosphatase 101 (H) 30 - 99 U/L    Total Bilirubin 0.5 0.1 - 1.5 mg/dL    Albumin 3.3 3.2 - 4.9 g/dL    Total Protein 6.8 6.0 - 8.2 g/dL    Globulin 3.5 1.9 - 3.5 g/dL    A-G Ratio 0.9 g/dL   MAGNESIUM   Result Value Ref Range    Magnesium 2.1 1.5 - 2.5 mg/dL   PHOSPHORUS   Result Value Ref Range    Phosphorus 3.5 2.5 - 4.5 mg/dL   CBC with Differential   Result Value Ref Range    WBC 14.8 (H) 4.8 - 10.8 K/uL    RBC 4.30 4.20 - 5.40 M/uL    Hemoglobin 9.6 (L) 12.0 - 16.0 g/dL    Hematocrit 33.2 (L) 37.0 - 47.0 %    MCV 77.2 (L) 81.4 - 97.8 fL    MCH 22.3 (L) 27.0 - 33.0 pg    MCHC 28.9 (L) 33.6 - 35.0 g/dL    RDW 46.9 35.9 - 50.0 fL    Platelet Count 188 164 - 446 K/uL    MPV 11.3 9.0 - 12.9 fL    Neutrophils-Polys 76.90 (H) 44.00 - 72.00 %    Lymphocytes 17.90 (L) 22.00 - 41.00 %    Monocytes 3.40 0.00 - 13.40 %    Eosinophils 0.90 0.00 - 6.90 %    Basophils 0.00 0.00 - 1.80 %    Nucleated RBC 1.80 /100 WBC    Neutrophils (Absolute) 11.38 (H) 2.00 - 7.15 K/uL    Lymphs (Absolute) 2.65 1.00 - 4.80 K/uL    Monos (Absolute) 0.50 0.00 - 0.85 K/uL    Eos (Absolute) 0.13 0.00 - 0.51 K/uL    Baso (Absolute) 0.00 0.00 - 0.12 K/uL    NRBC (Absolute) 0.26 K/uL    Hypochromia 1+     Anisocytosis 2+ (A)     Macrocytosis 2+ (A)     Microcytosis 1+    Basic Metabolic Panel   Result Value Ref Range    Sodium 137 135 - 145 mmol/L    Potassium 4.9 3.6 - 5.5 mmol/L    Chloride 106 96 - 112 mmol/L    Co2 17 (L) 20 - 33 mmol/L    Glucose 145 (H) 65 - 99 mg/dL    Bun 29 (H) 8 - 22 mg/dL    Creatinine 0.83 0.50 - 1.40 mg/dL    Calcium 8.3 (L) 8.5 - 10.5 mg/dL    Anion Gap 14.0 7.0 - 16.0   ESTIMATED GFR   Result Value Ref Range    GFR (CKD-EPI) 83 >60 mL/min/1.73 m 2   Heparin Xa (Unfractionated)   Result Value Ref Range    Heparin Xa (UFH)  0.31 IU/mL   DIFFERENTIAL MANUAL   Result Value Ref Range    Myelocytes 0.90 %    Manual Diff Status PERFORMED    PERIPHERAL SMEAR REVIEW   Result Value Ref Range    Peripheral Smear Review see below    PLATELET ESTIMATE   Result Value Ref Range    Plt Estimation Normal    MORPHOLOGY   Result Value Ref Range    RBC Morphology Present     Polychromia 1+     Rouleaux Slight    IRON/TOTAL IRON BIND   Result Value Ref Range    Iron 10 (L) 40 - 170 ug/dL    Total Iron Binding 298 250 - 450 ug/dL    Unsat Iron Binding 288 110 - 370 ug/dL    % Saturation 3 (L) 15 - 55 %   FERRITIN   Result Value Ref Range    Ferritin 13.2 10.0 - 291.0 ng/mL   RETICULOCYTES COUNT   Result Value Ref Range    Reticulocyte Count 2.8 (H) 0.8 - 2.1 %    Retic, Absolute 0.12 (H) 0.04 - 0.06 M/uL    Imm. Reticulocyte Fraction 34.6 (H) 9.3 - 17.4 %    Retic Hgb Equivalent 21.9 (L) 29.0 - 35.0 pg/cell   ESTIMATED GFR   Result Value Ref Range    GFR (CKD-EPI) 86 >60 mL/min/1.73 m 2   CBC with Differential   Result Value Ref Range    WBC 10.6 4.8 - 10.8 K/uL    RBC 4.07 (L) 4.20 - 5.40 M/uL    Hemoglobin 9.0 (L) 12.0 - 16.0 g/dL    Hematocrit 30.7 (L) 37.0 - 47.0 %    MCV 75.4 (L) 81.4 - 97.8 fL    MCH 22.1 (L) 27.0 - 33.0 pg    MCHC 29.3 (L) 33.6 - 35.0 g/dL    RDW 44.7 35.9 - 50.0 fL    Platelet Count 183 164 - 446 K/uL    MPV 10.5 9.0 - 12.9 fL    Neutrophils-Polys 66.40 44.00 - 72.00 %    Lymphocytes 21.50 (L) 22.00 - 41.00 %    Monocytes 9.80 0.00 - 13.40 %    Eosinophils 1.30 0.00 - 6.90 %    Basophils 0.30 0.00 - 1.80 %    Immature Granulocytes 0.70 0.00 - 0.90 %    Nucleated RBC 2.20 /100 WBC    Neutrophils (Absolute) 7.06 2.00 - 7.15 K/uL    Lymphs (Absolute) 2.28 1.00 - 4.80 K/uL    Monos (Absolute) 1.04 (H) 0.00 - 0.85 K/uL    Eos (Absolute) 0.14 0.00 - 0.51 K/uL    Baso (Absolute) 0.03 0.00 - 0.12 K/uL    Immature Granulocytes (abs) 0.07 0.00 - 0.11 K/uL    NRBC (Absolute) 0.23 K/uL   Basic Metabolic Panel   Result Value Ref Range     Sodium 130 (L) 135 - 145 mmol/L    Potassium 4.6 3.6 - 5.5 mmol/L    Chloride 102 96 - 112 mmol/L    Co2 17 (L) 20 - 33 mmol/L    Glucose 166 (H) 65 - 99 mg/dL    Bun 27 (H) 8 - 22 mg/dL    Creatinine 0.66 0.50 - 1.40 mg/dL    Calcium 8.3 (L) 8.5 - 10.5 mg/dL    Anion Gap 11.0 7.0 - 16.0   ESTIMATED GFR   Result Value Ref Range    GFR (CKD-EPI) 106 >60 mL/min/1.73 m 2   POCT glucose device results   Result Value Ref Range    POC Glucose, Blood 410 (HH) 65 - 99 mg/dL   POCT glucose device results   Result Value Ref Range    POC Glucose, Blood 399 (H) 65 - 99 mg/dL   POCT glucose device results   Result Value Ref Range    POC Glucose, Blood 421 (HH) 65 - 99 mg/dL   POCT glucose device results   Result Value Ref Range    POC Glucose, Blood 427 (HH) 65 - 99 mg/dL   POCT glucose device results   Result Value Ref Range    POC Glucose, Blood 352 (H) 65 - 99 mg/dL   POCT glucose device results   Result Value Ref Range    POC Glucose, Blood 365 (H) 65 - 99 mg/dL   POCT glucose device results   Result Value Ref Range    POC Glucose, Blood 330 (H) 65 - 99 mg/dL   POCT glucose device results   Result Value Ref Range    POC Glucose, Blood 357 (H) 65 - 99 mg/dL   POCT glucose device results   Result Value Ref Range    POC Glucose, Blood 366 (H) 65 - 99 mg/dL   POCT glucose device results   Result Value Ref Range    POC Glucose, Blood 360 (H) 65 - 99 mg/dL   POCT glucose device results   Result Value Ref Range    POC Glucose, Blood 308 (H) 65 - 99 mg/dL   POCT glucose device results   Result Value Ref Range    POC Glucose, Blood 104 (H) 65 - 99 mg/dL   POCT glucose device results   Result Value Ref Range    POC Glucose, Blood 114 (H) 65 - 99 mg/dL   POCT glucose device results   Result Value Ref Range    POC Glucose, Blood 85 65 - 99 mg/dL   POCT glucose device results   Result Value Ref Range    POC Glucose, Blood 81 65 - 99 mg/dL   POCT glucose device results   Result Value Ref Range    POC Glucose, Blood 80 65 - 99 mg/dL   POCT  glucose device results   Result Value Ref Range    POC Glucose, Blood 98 65 - 99 mg/dL   POCT glucose device results   Result Value Ref Range    POC Glucose, Blood 131 (H) 65 - 99 mg/dL   POCT glucose device results   Result Value Ref Range    POC Glucose, Blood 115 (H) 65 - 99 mg/dL   POCT glucose device results   Result Value Ref Range    POC Glucose, Blood 117 (H) 65 - 99 mg/dL   POCT glucose device results   Result Value Ref Range    POC Glucose, Blood 153 (H) 65 - 99 mg/dL   POCT glucose device results   Result Value Ref Range    POC Glucose, Blood 202 (H) 65 - 99 mg/dL   POCT glucose device results   Result Value Ref Range    POC Glucose, Blood 188 (H) 65 - 99 mg/dL   POCT glucose device results   Result Value Ref Range    POC Glucose, Blood 171 (H) 65 - 99 mg/dL   POCT glucose device results   Result Value Ref Range    POC Glucose, Blood 154 (H) 65 - 99 mg/dL   POCT glucose device results   Result Value Ref Range    POC Glucose, Blood 181 (H) 65 - 99 mg/dL       RADIOLOGY  I have independently interpreted the diagnostic imaging associated with this visit and am waiting the final reading from the radiologist.   My preliminary interpretation is as follows:   -   Radiologist interpretation:   No orders to display           MEDICAL DECISION MAKING    ED Observation Status? No; Patient does not meet criteria for ED Observation.     ED COURSE AND PLAN    Alis Faye is a 53 y.o. female presenting to the emergency department for the second time today for an urticarial reaction.  Patient has small raised lesion to the right cheek, a small area of urticaria to the left upper chest.  Is complaining of an itchy rash to the arms and legs however this is not appreciated on my exam.  Compared to presentation earlier today, urticaria seems to have improved.  I reminded patient and daughter that the steroid takes 4 to 6 hours to start to work, they have obtained the antihistamines previously prescribed from the pharmacy.   There is certainly no evidence of anaphylaxis on exam.  I administered a dose of Benadryl and cetirizine to the patient in the emergency department.  No indication for observation.  I represcribed her cetirizine and EpiPen to a 24-hour pharmacy, advised to  tonight.  Patient is appropriate for discharge home, return precaution discussed.    ---Pertinent ED Course---:    11:03 PM I reviewed the patient's old records in Epic, medication list, allergies, past medical history and performed a physical examination.                 Procedures:      ----------------------------------------------------------------------------------  DISCUSSIONS    I have discussed management of the patient with the following physicians and YUNIOR's:      Discussion of management with other hospitals or appropriate source(s):     Escalation of care considered, and ultimately not performed: Considered but no indication for labs or diagnostic testing    Barriers to care at this time, including but not limited to:     Decision tools and prescription drugs considered including, but not limited to: Represcribe medications as described above    FINAL IMPRESSION    1. Urticaria    2. Allergic reaction, initial encounter            DISPOSITION    Discharge home, Stable          This chart was dictated using an electronic voice recognition software. The chart has been reviewed and edited but there is still possibility for dictation errors due to limitation of software.    Terrence Aguillon,  1/11/2024

## 2024-01-12 NOTE — DISCHARGE INSTRUCTIONS
You were seen in the emergency department for an allergic reaction which may be related to the shellfish that he ate yesterday.  Please avoid shellfish in the future.  You are prescribed a medication that was sent to a 24-hour pharmacy, please  the medication and take as prescribed on the bottle.  You are also given antihistamines in the emergency department.  If you develop shortness of breath, swelling in your mouth, lightheadedness, come back for reevaluation.

## 2024-01-12 NOTE — ED PROVIDER NOTES
ED Provider Note    Primary care provider: Renetta Cortes M.D.    CHIEF COMPLAINT  Chief Complaint   Patient presents with    Rash    Allergic Reaction         HPI  Alis Faye is a 53 y.o. female who presents to the Emergency Department with a pruritic rash that began shortly after eating shellfish yesterday.  She was eating some shrimp and then this rash developed.  She notes some tongue swelling as well.  She went to bed, awoke and still had the rash throughout her body.  She denies any new lotions or detergents.  Has never had this happen previously.  She has had treatment before but it has been many years.  Denies any chest pain, shortness of breath, nausea or vomiting.  HbA1c is around 7.2.      External Record Review: Reviewed the most recent outpatient notes from the family medicine clinic in May 2022.  She was hospitalized in April for DKA and saddle pulmonary embolism.  Underwent EKOS.    REVIEW OF SYSTEMS  See HPI.     PAST MEDICAL HISTORY   has a past medical history of Anemia, Diabetes, HTN (hypertension) (5/16/2022), and Obesity.    SURGICAL HISTORY  patient denies any surgical history    SOCIAL HISTORY  Social History     Tobacco Use    Smoking status: Never    Smokeless tobacco: Never   Vaping Use    Vaping Use: Never used   Substance Use Topics    Alcohol use: No    Drug use: No      Social History     Substance and Sexual Activity   Drug Use No       FAMILY HISTORY  History reviewed. No pertinent family history.    CURRENT MEDICATIONS  Reviewed.  See Encounter Summary.     ALLERGIES  Allergies   Allergen Reactions    Nkda [No Known Drug Allergy]        PHYSICAL EXAM  VITAL SIGNS: BP (!) 174/92   Pulse 98   Temp 36.1 °C (97 °F) (Temporal)   Resp 18   Wt 110 kg (242 lb 8.1 oz)   SpO2 96%   BMI 48.98 kg/m²   Constitutional: Awake, alert in no apparent distress.  HENT: Normocephalic, Bilateral external ears normal. Nose normal.  No obvious tongue swelling.  Eyes: Conjunctiva normal,  non-icteric, EOMI.    Thorax & Lungs: Easy unlabored respirations, Clear to ascultation bilaterally.  Cardiovascular: Regular rate, Regular rhythm, No murmurs, rubs or gallops. Bilateral pulses symmetrical.   Abdomen:  Soft, nontender, nondistended, normal active bowel sounds.   :    Skin: Scattered hives on the bilateral upper extremities and lower extremities, patient scratching.    Musculoskeletal:   No cyanosis, clubbing or edema. No leg asymmetry.   Neurologic: Alert, Grossly non-focal.   Psychiatric: Normal affect, Normal mood  Lymphatic:        COURSE & MEDICAL DECISION MAKING  Pertinent Labs & Imaging studies reviewed. (See chart for details)    COURSE & MEDICAL DECISION MAKING  Pertinent Labs & Imaging studies reviewed. (See chart for details)    Differential diagnoses include but are not limited to: Allergic reaction, anaphylaxis anaphylactoid reaction    4:23 PM - Nursing notes reviewed, patient seen and examined at bedside.    Escalation of care considered, and ultimately not performed: blood analysis and diagnostic imaging.    Decision Making:  This is a pleasant 53 y.o. year old female who presents with pruritic rash after eating shellfish.  She has tolerated shellfish previously but has been many years.  Suspect this is the cause for the patient's reaction today as she does not note any other new foods, lotions or medications.  No signs of anaphylaxis at this time.  She will be treated with 4 mg of dexamethasone, will avoid the higher dosing schedule as she is diabetic.  Also will do twice daily antihistamines.  I will prescribe an EpiPen and I explained when and how to use it.    ADDITIONAL PROBLEM LIST  Diabetes, fairly well-controlled, history of PEs, still taking anticoagulation.     The patient was discharged home (see d/c instructions) was told to return immediately for any signs or symptoms listed, or any worsening at all.  The patient verbally agreed to the discharge precautions and  follow-up plan which is documented in EPIC.    Discharge Medications:  New Prescriptions    CETIRIZINE (ZYRTEC) 10 MG TAB    Take 1 Tablet by mouth 2 times a day.    EPINEPHRINE 0.3 MG/0.3ML SOLUTION PREFILLED SYRINGE    Inject the contents of the epipen into the thigh, hold for 3 seconds and release from thigh as needed for anaphylaxis.       FINAL IMPRESSION  1. Allergic reaction, initial encounter

## 2024-10-09 ENCOUNTER — HOSPITAL ENCOUNTER (OUTPATIENT)
Dept: LAB | Facility: MEDICAL CENTER | Age: 54
End: 2024-10-09
Attending: STUDENT IN AN ORGANIZED HEALTH CARE EDUCATION/TRAINING PROGRAM
Payer: MEDICAID

## 2024-10-09 LAB
25(OH)D3 SERPL-MCNC: 16 NG/ML (ref 30–100)
ALBUMIN SERPL BCP-MCNC: 3.9 G/DL (ref 3.2–4.9)
ALBUMIN/GLOB SERPL: 0.9 G/DL
ALP SERPL-CCNC: 76 U/L (ref 30–99)
ALT SERPL-CCNC: 14 U/L (ref 2–50)
ANION GAP SERPL CALC-SCNC: 11 MMOL/L (ref 7–16)
AST SERPL-CCNC: 15 U/L (ref 12–45)
BILIRUB SERPL-MCNC: 0.5 MG/DL (ref 0.1–1.5)
BUN SERPL-MCNC: 10 MG/DL (ref 8–22)
CALCIUM ALBUM COR SERPL-MCNC: 9.4 MG/DL (ref 8.5–10.5)
CALCIUM SERPL-MCNC: 9.3 MG/DL (ref 8.5–10.5)
CHLORIDE SERPL-SCNC: 104 MMOL/L (ref 96–112)
CHOLEST SERPL-MCNC: 167 MG/DL (ref 100–199)
CO2 SERPL-SCNC: 24 MMOL/L (ref 20–33)
CREAT SERPL-MCNC: 0.49 MG/DL (ref 0.5–1.4)
ERYTHROCYTE [DISTWIDTH] IN BLOOD BY AUTOMATED COUNT: 46.5 FL (ref 35.9–50)
FERRITIN SERPL-MCNC: 3.9 NG/ML (ref 10–291)
GFR SERPLBLD CREATININE-BSD FMLA CKD-EPI: 113 ML/MIN/1.73 M 2
GLOBULIN SER CALC-MCNC: 4.2 G/DL (ref 1.9–3.5)
GLUCOSE SERPL-MCNC: 157 MG/DL (ref 65–99)
HCT VFR BLD AUTO: 32.4 % (ref 37–47)
HDLC SERPL-MCNC: 49 MG/DL
HGB BLD-MCNC: 8.6 G/DL (ref 12–16)
IRON SATN MFR SERPL: 5 % (ref 15–55)
IRON SERPL-MCNC: 18 UG/DL (ref 40–170)
LDLC SERPL CALC-MCNC: 99 MG/DL
MCH RBC QN AUTO: 18.2 PG (ref 27–33)
MCHC RBC AUTO-ENTMCNC: 26.5 G/DL (ref 32.2–35.5)
MCV RBC AUTO: 68.6 FL (ref 81.4–97.8)
PLATELET # BLD AUTO: 415 K/UL (ref 164–446)
PMV BLD AUTO: 9.2 FL (ref 9–12.9)
POTASSIUM SERPL-SCNC: 3.7 MMOL/L (ref 3.6–5.5)
PROT SERPL-MCNC: 8.1 G/DL (ref 6–8.2)
RBC # BLD AUTO: 4.72 M/UL (ref 4.2–5.4)
SODIUM SERPL-SCNC: 139 MMOL/L (ref 135–145)
TIBC SERPL-MCNC: 372 UG/DL (ref 250–450)
TRIGL SERPL-MCNC: 97 MG/DL (ref 0–149)
TSH SERPL DL<=0.005 MIU/L-ACNC: 2.33 UIU/ML (ref 0.38–5.33)
UIBC SERPL-MCNC: 354 UG/DL (ref 110–370)
WBC # BLD AUTO: 6.1 K/UL (ref 4.8–10.8)

## 2024-10-09 PROCEDURE — 82306 VITAMIN D 25 HYDROXY: CPT

## 2024-10-09 PROCEDURE — 85027 COMPLETE CBC AUTOMATED: CPT

## 2024-10-09 PROCEDURE — 82728 ASSAY OF FERRITIN: CPT

## 2024-10-09 PROCEDURE — 83540 ASSAY OF IRON: CPT

## 2024-10-09 PROCEDURE — 83550 IRON BINDING TEST: CPT

## 2024-10-09 PROCEDURE — 36415 COLL VENOUS BLD VENIPUNCTURE: CPT

## 2024-10-09 PROCEDURE — 80053 COMPREHEN METABOLIC PANEL: CPT

## 2024-10-09 PROCEDURE — 80061 LIPID PANEL: CPT

## 2024-10-09 PROCEDURE — 84443 ASSAY THYROID STIM HORMONE: CPT

## 2025-02-10 NOTE — DISCHARGE PLANNING
Hospital Care Management- Medical Social Work      LMSW requested MD to send Rx for Eliquis to pt's pharmacy so that price and coverage can be checked. LMSW to check Rx once e-prescribed.     Update: YANET spoke with [facility] pharmacy. Pt's Eliquis has $0 copay and no prior auth needed. Schedulers were able to make pt a new PCP appointment through [facility] primary care for 5/16/22 (added to discharge paperwork).              That's fine, order signed. Thank you.